# Patient Record
Sex: FEMALE | Race: WHITE | Employment: FULL TIME | URBAN - METROPOLITAN AREA
[De-identification: names, ages, dates, MRNs, and addresses within clinical notes are randomized per-mention and may not be internally consistent; named-entity substitution may affect disease eponyms.]

---

## 2017-01-12 ENCOUNTER — HOSPITAL ENCOUNTER (OUTPATIENT)
Dept: PHYSICAL THERAPY | Age: 37
Discharge: HOME | End: 2017-01-12
Payer: COMMERCIAL

## 2017-01-12 PROCEDURE — 97166 OT EVAL MOD COMPLEX 45 MIN: CPT

## 2017-01-12 PROCEDURE — 97530 THERAPEUTIC ACTIVITIES: CPT

## 2017-01-12 NOTE — PROGRESS NOTES
Brett Casarez  : 1980 [facility] at [address]  [phone]   [phone]       OUTPATIENT OCCUPATIONAL THERAPY SEATING AND POSITIONING EVALUATION: Initial Assessment and Treatment Day: 2017    ICD-10: Treatment Diagnosis: Paraplegia, incomplete (G82.22); Dependence on wheelchair (Z99.3)  Precautions/Allergies:   Pcn [penicillins]   Fall Risk Score: 9 (? 5 = High Risk)  MD Orders: Referral for pressure mapping clinic for pressure ulcer to left ischial. MEDICAL/REFERRING DIAGNOSIS:   Pressure ulcer of left buttock, stage 3 [L89.323]  Paraplegia, incomplete [G82.22]   DATE OF ONSET:    REFERRING PHYSICIAN: Corinna Argueta MD  RETURN PHYSICIAN APPOINTMENT: Unknown. INITIAL ASSESSMENT:  Ms. Canelo Kurtz is a pleasant lady with a history of C6/7 incomplete quadriplegia with absent BLE strength with inability to stand, decreased trunk control, and absent hand intrinsic musculature affecting her dexterity. She has a recent history of a stage 3 L ischial tuberosity ulcer being treated by Dr. Elizabeth Martines via bed rest and wet to dry wound dressing. MD ordered a pressure mapping for the most effective pressure re-distribution wheelchair cushion while she is in her ultra lightweight wheelchair. She does have a flexible R pelvic obliquity; however, she has increased pressure over her L ischial tuberosity. Several cushions were trialed. Her current wheelchair cushion yielded a 147.8 mmHG reading over her L ischial tuberosity (generally 60 - 80 mmHG is the most acceptable range). The "[employer]lite evolution skin protection/positioning cushion yielded a reading of 93 mmHG over her L ischial tuberosity. She liked this for ease of maintenance, but would like to trial additional cushions. If we change the cushion, she will likely need a new ultra lightweight wheelchair to accommodate the cushion height.   Plus, her current wheelchair is from 2008 and the hardware is wearing out such as her scissor brakes. Plan to do this as well on a subsequent visit. Ms. Ramsey Valerio also performed several transfers including into/out of her car. She tended to be in contact with the L wheel going from her car to the wheelchair, so recommended she raise the height of her car seat prior to transfer. This alleviated shear/contact on her L buttock. PLAN OF CARE:   PROBLEM LIST:   1. Decreased Strength  2. Decreased Balance  3. Decreased Skin Integrity/Hygeine   4. Increased pressure reading in current wheelchair setup with current foam cushion. 5. Current wheelchair is from 2008, does not accommodate best pressure redistributing wheelchair cushion, and worn is beyond repair (i.e. Scissor brakes). INTERVENTIONS PLANNED:  1. Theraputic activity  2. Wheelchair management   TREATMENT PLAN:  Effective Dates: 1/12/2017 TO 1/26/2017. Frequency/Duration: 1 time a week for 2 weeks  GOALS: (Goals have been discussed and agreed upon with patient.)  Short-Term Functional Goals: Time Frame: 1 visit  1. Colleen Washington will complete a car transfer without L buttock shear to prevent further skin breakdown independently. MET  Discharge Goals: Time Frame: 2 visits  1. Colleen Washington will be fitted for new ultra lightweight manual wheelchair to acomodate height of new cushion and enable independence with mobility related activities of daily living. Rehabilitation Potential For Stated Goals: Good  Regarding Michelle Carranza's therapy, I certify that the treatment plan above will be carried out by a therapist or under their direction. Thank you for this referral,  EVERARDO Whaley, OTR/L     Referring Physician Signature: Maddi Garcia MD _________________________  Date _________                OCCUPATIONAL PROFILE & HISTORY:   History of Present Injury/Illness (Reason for Referral):  Michelle Carranza sustained a C6/7 incomplete spinal cord injury in 1985. She works at [employer], drives, completes her own ADL. She recently developed a stage 3 decubitus ulcer after sitting on a heated car seat while wearing a dress - she noticed it on November 29th. She saw Dr. Darrell Patel at the [address] in December with improvements noted in wound (1 cm). She bought a gel overlay for her bed and was on bedrest for about 3 weeks. She has a standing frame that she stands in for 3 hours a day. She is out of the chair up to 10 hours a day. Prior to this, she has not developed a pressure ulcerwhen she  She does a squat pivot transfer into her car ([address]) and states she has a [employer]  for about hour a day. She states she doesn't typically do pressure reliefs, but moves around a lot. She has a standing frame that she. Past Medical History/Comorbidities:   Ms. Yohana España  has a past medical history of Abdominal pain; ADD (attention deficit disorder); Anxiety; Back pain; Chronic cystitis (4/3/2014); Closed fracture of left fibula and tibia; Depression; Edema; Flank pain; GERD (gastroesophageal reflux disease); Gross hematuria; Hand deformities, acquired; Hand pain; Herpes simplex; Incontinence of urine in female; Left ankle pain; Neurogenic bladder (4/3/2014); Neurogenic bladder; Neurogenic bladder, NOS; Neuropathy; Obesity; Other chronic cystitis; Paraplegia ([address] Quadriplegia ([address] and Urinary tract infection, site not specified. Ms. Yohana España  has a past surgical history that includes orthopaedic (1994) and other surgical.  Social History/Living Environment:   Home Environment: Private residence  Wheelchair Ramp: Yes  Living Alone: No  Current DME Used/Available at Home: Wheelchair, Transfer bench (padded shower bench; ultra lightweight - ti-lite)  Tub or Shower Type: Tub/Shower combinationShe works at [employer] office 40 hours a day. 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 Boyfriend assists with checking her bottom and changing her dressing. Activity Level: Drives, works, and is very active. Transfers per day: 8- 12 hours/day. Computer Use: 40 hour/week. Leisure: \"Boats. \" Swims   Driving: Drives to work on a daily basis in [employer]. ALTERNATE SEATING SURFACES: Tub transfer bench; bed with gel overlay; car seat  Current Medications:    Current Outpatient Prescriptions:     ondansetron hcl (ZOFRAN, AS HYDROCHLORIDE,) 8 mg tablet, Take 1 Tab by mouth two (2) times a day., Disp: 60 Tab, Rfl: 5    traMADol (ULTRAM) 50 mg tablet, Take 1 Tab by mouth every six (6) hours as needed for Pain. Max Daily Amount: 200 mg., Disp: 60 Tab, Rfl: 2    [START ON 1/18/2017] HYDROcodone-acetaminophen (NORCO) 7.5-325 mg per tablet, Take 1 Tab by mouth every eight (8) hours as needed for Pain. Max Daily Amount: 3 Tabs., Disp: 90 Tab, Rfl: 0    HYDROcodone-acetaminophen (NORCO) 7.5-325 mg per tablet, Take 1 Tab by mouth every eight (8) hours as needed for Pain. Max Daily Amount: 3 Tabs., Disp: 90 Tab, Rfl: 0    HYDROcodone-acetaminophen (NORCO) 7.5-325 mg per tablet, Take 1 Tab by mouth every eight (8) hours as needed for Pain. Max Daily Amount: 3 Tabs., Disp: 90 Tab, Rfl: 0    acyclovir (ZOVIRAX) 400 mg tablet, Take 400 mg by mouth three (3) times daily. For 5 days, Disp: , Rfl:     ketoconazole (NIZORAL) 200 mg tablet, Take 200 mg by mouth daily. For 14 days, Disp: , Rfl:     meloxicam (MOBIC) 15 mg tablet, Take 15 mg by mouth daily. , Disp: , Rfl:     omeprazole (PRILOSEC) 20 mg capsule, Take 20 mg by mouth daily. , Disp: , Rfl:     phentermine (ADIPEX-P) 37.5 mg tablet, Take 37.5 mg by mouth every morning., Disp: , Rfl:     ciprofloxacin HCl (CIPRO) 500 mg tablet, Take 1 Tab by mouth two (2) times a day., Disp: 14 Tab, Rfl: 0    ondansetron (ZOFRAN ODT) 8 mg disintegrating tablet, Take 1 Tab by mouth every eight (8) hours as needed for Nausea., Disp: 12 Tab, Rfl: 0    ibuprofen (MOTRIN) 800 mg tablet, Take  by mouth., Disp: , Rfl:     cyclobenzaprine (FLEXERIL) 10 mg tablet, Take 10 mg by mouth three (3) times daily as needed. , Disp: , Rfl:     gabapentin (NEURONTIN) 600 mg tablet, Take  by mouth two (2) times a day.  , Disp: , Rfl:            Date Last Reviewed:  1/12/2017   DME: TiLite TR (2008) - fixed wheel axle; padded tub-transfer bench; slimline skin protection wheelchair cushion. Complexity Level : Expanded review of therapy/medical records (1-2):  MODERATE COMPLEXITY   ASSESSMENT OF OCCUPATIONAL PERFORMANCE:   GROSS PRESENTATION/POSTURE:   Seated: Sits in neutral to anterior pelvic tilt   MENTAL STATUS:   Neurologic State: Alert    Orientation Level: Oriented to person, place, time and situation  Cognition: Intact. Perception: Appears intact. Safety/Judgement: Good. Understands/expresses information appropriately   Memory: Immediate Short Term Memory: Intact  Long term: Intact  VISION: Identifies obstacles in visual field and appropriately navigates around objects in wheelchair. Corrective Lenses: None. FUNCTIONAL MOBILITY:  Utilizes a semi-circular propulsion pattern for long distances in ultra lightweight wheelchair. She is completely independent in her ultra lightweight wheelchair including transfers, indoor, and outdoor environments. Transfers:   Chair to Mat: Modified independent  Sit to Stand: Unable  Supine to sit: Modified independent. Sit to supine: Modified independent  Mat Evaluation:   Measurements in sitting (in collaboration with )   Shoulder width, chest width, hip width, seat to top of head, seat to top of shoulder, seat to elbow, seat depth, and seat to floor all measured (see  note for details). Sitting Posture   Pelvis  Anterior/Posterior Pelvic Tilt: Sit in anterior pelvic tilt. Lateral pelvic tilt: Flexible R lateral pelvic tilt in supine.     Pelvic Obliquity: Sits in anterior pelvic tilt (flexibile)  Pelvic Rotation: No overt rotation noted. Spine   Thoracic spine: Able to retract scapulae: slight flexible kyphosis. Lumbar spine:  Lordotic flexible hypermobile lumbar spine. Lateral trunk: No overt shortening noted. Upper Extremities   Shoulder symmetry: Grossly equal in height. Lower Extremities  Hip position: Neutral  Knees: Good passive hamstring length. Feet: Passive dorsiflexion  Head/neck: WFL  TONE/SPASTICITY: Noted abductor  LLE>RLE in supine  SKIN INTEGRITY: L buttock with   SENSATION:   STRENGTH:   LUE Strength/ROM (General): Shoulders 5/5; elbow flexion/extension 5-/5; wrist flexion/extension: 4+/5;  3+/5; intrinsics: 0/5 RUE Strength/ROM (General): Shoulders 5/5; elbow flexion/extension 5/5; wrist flexion/extension: 4+/5;  4-/5; intrinsics: 0/5     LLE Strength/ROM (General): 0/5 (reports some gluteal activation) RLE Strength/ROM (General): 0/5 (reports some gluteal activation)   EDEMA: None noted. BALANCE:   Seated (Static): Good  Seated (Dynamic): 10\" forward reach. SKIN   Current Status: Wound is down to about 1 cm in length just distal to L ischial tuberosity. History: Noticed wound on L buttock/thigh on November 29th. Method for skin check: Boyfriend assists with wet to dry dressing. Resources for Management: Wound care center.      PRESSURE MAPPING Sensors Contacted Average Pressure Maximum Pressure Outcomes   Pressure Mapping #1 (Baseline Reading in current w/c setup - slimline) 244 38.8 mmHG 147.8 mmHG L IT    Pressure Mapping #2 - mat 225 37.9 mmHG 256 mmHG L IT    Pressure Mapping #3 - in current wheelchair [facility] cushion 292 38 mmHG 88 mmHG 60 mmHG decrease (from baseline)   Pressure Mapping #4 - [employer] Evolution 270 38.1 mmHG 93 mmHG 55 mmHG decrease   Pressure Mapping #5 - [employer] 2 gel 230 39.1 mmHG 98 mmHG  50 mmHG decrease   Pressure Mapping #6 - [employer] 262 40 mmHG 112 mmHG 36 mmHG decrease     FINAL RECOMMENDATIONS:   Physical Skills Involved:  1. Range of Motion  2. Balance  3. Mobility  4. Strength  5. Fine or Gross Motor Coordination  6. Sensation  7. Dexterity Cognitive Skills Affected (resulting in the inability to perform in a timely and safe manner):  1. No apparent deficits Psychosocial Skills Affected:  1. Habits  2. Routines and Behaviors  3. Environmental Adaptations   Number of elements that affect the Plan of Care: 3-5:  MODERATE COMPLEXITY   CLINICAL DECISION MAKING:   Outcome Measure: Tool Used: Lower Extremity Functional Scale (LEFS)  Score:  Initial: 41/80 Most Recent: X/80 (Date: -- )   Interpretation of Score: 20 questions each scored on a 5 point scale with 0 representing \"extreme difficulty or unable to perform\" and 4 representing \"no difficulty\". The lower the score, the greater the functional disability. 80/80 represents no disability. Minimal detectable change is 9 points. Medical Necessity:   · Patient demonstrates good rehab potential due to higher previous functional level. Reason for Services/Other Comments:  · Patient continues to require skilled intervention due to decreased wheelchair skills and knowledge of available equipment for pressure reduction/redistribution . Clinical Decision-Making Assessment:  Michelle Carranza trialed a number of options for wheelchair cushions and a solid back. Her current cushion is not providing adequate pressure re-distribution. The [employer] cushion provided a reduction of at least 55 mmHG to 93 mmHG and was the most comfortable for her. She would like to bring a cushion she has at home to trial/pressure map before making a final decision. She will also likely need a new ultra lightweight wheelchair as the cushion will alter the height/propulsion angle. Clinical Decision-Making: MODERATE COMPLEXITY   TREATMENT:   (In addition to Assessment/Re-Assessment sessions the following treatments were rendered)  Therapeutic Activity: (   15 minutes):   Therapeutic activities including car transfers to improve dynamic movement of arm - bilateral, leg - bilateral and trunk  to improve functional lifting, reaching and pushing . Required moderate   to promote static and dynamic balance in sitting and promote environmental adaptation (height of car seat). Michelle Carranza completed several wheelchair to car/car to wheelchair transfers to demonstrate how she typically does it. Suggested she raise the height of her car seat prior to transferring out of the car into her wheelchair and she did so without being in contact with wheel. Treatment/Session Assessment:  Michelle Carranza completed car transfers successfully without contacting her bottom against the wheelchair wheel. She plans to buy a padded toilet. · Pre-treatment Symptoms:    · Pain: Initial:  Pain Intensity 1: 5  Pain Location 1: Back  Pain Orientation 1: Lower  Post Session:  5/10 ·   Compliance with Program/Exercises: compliant today. · Recommendations/Intent for next treatment session: \"Next visit will focus on advancements to more challenging activities\".   Total Treatment Duration:  OT Patient Time In/Time Out  Time In: 1110  Time Out: EVERARDO Borges, OTR/L

## 2017-01-12 NOTE — PROGRESS NOTES
Ambulatory/Rehab Services H2 Model Falls Risk Assessment    Risk Factor Pts. ·   Confusion/Disorientation/Impulsivity  []    4 ·   Symptomatic Depression  []   2 ·   Altered Elimination  []   1 ·   Dizziness/Vertigo  []   1 ·   Gender (Male)  []   1 ·   Any administered antiepileptics (anticonvulsants):  []   2 ·   Any administered benzodiazepines:  []   1 ·   Visual Impairment (specify):  []   1 ·   Portable Oxygen Use  []   1 ·   Orthostatic ? BP  []   1 ·   History of Recent Falls (within 3 mos.)  [x]   5     Ability to Rise from Chair (choose one) Pts. ·   Ability to rise in a single movement  []   0 ·   Pushes up, successful in one attempt  []   1 ·   Multiple attempts, but successful  []   3 ·   Unable to rise without assistance  [x]   4   Total: (5 or greater = High Risk) 9     Falls Prevention Plan:   [x]                Physical Limitations to Exercise (specify): quadriplegic   [x]                Mobility Assistance Device (type): Ultra lightweight wheelchair. []                Exercise/Equipment Adaptation (specify):    ©2010 [facility] [address] [place] [address] States Patent #[id number].  Federal Law prohibits the replication, distribution or use without written permission from [employer]

## 2017-01-13 ENCOUNTER — HOSPITAL ENCOUNTER (OUTPATIENT)
Dept: WOUND CARE | Age: 37
Discharge: HOME | End: 2017-01-13
Attending: PODIATRIST
Payer: COMMERCIAL

## 2017-01-13 PROCEDURE — 99212 OFFICE O/P EST SF 10 MIN: CPT

## 2017-01-17 ENCOUNTER — HOSPITAL ENCOUNTER (OUTPATIENT)
Dept: PHYSICAL THERAPY | Age: 37
Discharge: HOME | End: 2017-01-17
Payer: COMMERCIAL

## 2017-01-17 PROCEDURE — 97168 OT RE-EVAL EST PLAN CARE: CPT

## 2017-01-17 PROCEDURE — 97542 WHEELCHAIR MNGMENT TRAINING: CPT

## 2017-01-17 NOTE — PROGRESS NOTES
Sue Perez  : 1980 [facility] at [address]  [phone]   [phone]       OUTPATIENT OCCUPATIONAL THERAPY SEATING AND POSITIONING EVALUATION: Treatment Day:  and Reassessment 2017    ICD-10: Treatment Diagnosis: Paraplegia, incomplete (G82.22); Dependence on wheelchair (Z99.3)  Precautions/Allergies:   Pcn [penicillins]   Fall Risk Score: 9 (? 5 = High Risk)  MD Orders: Referral for pressure mapping clinic for pressure ulcer to left ischial. MEDICAL/REFERRING DIAGNOSIS:   Pressure ulcer of left buttock, stage 3 [L89.323]  Paraplegia, incomplete [G82.22]   DATE OF ONSET:    REFERRING PHYSICIAN: Pedro Lujan MD(pressure mapping)/Dr. Coby Prakash MD (new wheelchair evaluation)  RETURN PHYSICIAN APPOINTMENT: Unknown. INTERDISCIPLINARY COLLABORATION: [facility] Smith ([employer])     INITIAL ASSESSMENT:  Ms. Haven Coyne is an active lady with a history of C6/7 incomplete quadriplegia with absent BLE strength with inability to stand, absent BLE sensation, decreased trunk control, and absent hand intrinsic musculature affecting her dexterity. She has a recent history of a stage 3 L ischial tuberosity ulcer being treated by Dr. Imelda Feliciano at the wound center. Miss Haven Coyne returned today for further pressure mapping and to assess her for a new ultra lightweight wheelchair. The comfort company [employer] skin protection and positioning cushion provided the most relief with 79.5 mmHG pressure over her L ischial tuberosity which is an improvement of at least 68 mmHG from her baseline. Recommend this or a similar skin protection and positioning cushion as she has decreased ability to functionally weight shift due to decreased trunk control, a R pelvic obliquity, and a history of stage 3 wound near her L ischial tuberosity. The adjustable tension straps were tightened for additional trunk support.   She needs a new ultra lightweight wheelchair to accommodate a new skin protection, positioning, and pressure reducing cushion for optimal propulsion pattern and less wear on her shoulder musculature. Also, the scissor brakes are worn beyond repair along with the entire chair which was purchased in 2008. She needs a new ultra lightweight manual wheelchair due the aforementioned reasons and to maximize her independence with mobility related activities of daily living including return to work. PLAN OF CARE:   PROBLEM LIST:   1. Decreased Strength  2. Decreased Balance  3. Decreased Skin Integrity/Hygeine   4. Increased pressure reading in current wheelchair setup with current foam cushion. 5. Current wheelchair is from 2008, does not accommodate best pressure redistributing wheelchair cushion, and worn is beyond repair (i.e. Scissor brakes). INTERVENTIONS PLANNED:  1. Theraputic activity  2. Wheelchair management   TREATMENT PLAN:  Effective Dates: 1/12/2017 TO 1/26/2017. Frequency/Duration: 1 time a week for 2 weeks  GOALS: (Goals have been discussed and agreed upon with patient.)  Short-Term Functional Goals: Time Frame: 1 visit  1. Chuckie Franco will complete a car transfer without L buttock shear to prevent further skin breakdown independently. MET  Discharge Goals: Time Frame: 2 visits  1. Chuckie Franco will be fitted for new ultra lightweight manual wheelchair to acomodate height of new cushion and enable independence with mobility related activities of daily living. MET  Rehabilitation Potential For Stated Goals: Goals met  EQUIPMENT RECOMMENDATIONS:  1. Manual ultra lightweight wheelchair to maximize independence with mobility related activities of daily living secondary to C6/7 quadriplegia and current wheelchair is worn beyond repair.          2. Skin protection and positioning cushion secondary to history of stage 3 wound near ischial tuberosity, R pelvic obliquity, and decreased ability to complete functional weight shifts secondary to decreased trunk control. 3. Plastic coated handrims to increase friction needed for effective wheelchair propulsion secondary to decreased /hand strength/function. 4. Pneumatic tires for ease of wheelchair propulsion and ability to propel chair over multiple surfaces. 5. Adjustable tension back to enable improved posture, trunk support, and effective wheelchair propulsion. Regarding Michelle Carranza's therapy, I certify that the treatment plan above will be carried out by a therapist or under their direction. Thank you for this referral,  EVERARDO Benjamin, OTR/L     Referring Physician Signature: _________________________  Date _________                OCCUPATIONAL PROFILE & HISTORY:   History of Present Injury/Illness (Reason for Referral):  Michelle Carranza sustained a C6/7 incomplete spinal cord injury in 1985. She works at [employer], drives, completes her own ADL. She recently developed a stage 3 decubitus ulcer after sitting on a heated car seat while wearing a dress - she noticed it on November 29th. She saw Dr. Haydee Garcia at the [facility] in December with improvements noted in wound (1 cm). She bought a gel overlay for her bed and was on bedrest for about 3 weeks. She has a standing frame that she stands in for 3 hours a day. She is out of the chair up to 10 hours a day. Prior to this, she has not developed a pressure ulcerwhen she  She does a squat pivot transfer into her car ([address]) and states she has a [employer]  for about hour a day. She states she doesn't typically do pressure reliefs, but moves around a lot. She has a standing frame that she. Past Medical History/Comorbidities:   Ms. Michel Barba  has a past medical history of Abdominal pain; ADD (attention deficit disorder); Anxiety; Back pain; Chronic cystitis (4/3/2014); Closed fracture of left fibula and tibia; Depression; Edema;  Flank pain; GERD (gastroesophageal reflux disease); Gross hematuria; Hand deformities, acquired; Hand pain; Herpes simplex; Incontinence of urine in female; Left ankle pain; Neurogenic bladder (4/3/2014); Neurogenic bladder; Neurogenic bladder, NOS; Neuropathy; Obesity; Other chronic cystitis; Paraplegia ([address] Quadriplegia ([address] and Urinary tract infection, site not specified. Ms. Laine Kelly  has a past surgical history that includes orthopaedic (1994) and other surgical.  Social History/Living Environment:    She works at [employer] office 40 hours a day. 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 Boyfriend assists with checking her bottom and changing her dressing. Activity Level: Drives, works, and is very active. Transfers per day: 8- 12 hours/day. Computer Use: 40 hour/week. Leisure: \"Boats. \" Swims   Driving: Drives to work on a daily basis in [employer]. ALTERNATE SEATING SURFACES: Tub transfer bench; bed with gel overlay; car seat  Current Medications:    Current Outpatient Prescriptions:     ondansetron hcl (ZOFRAN, AS HYDROCHLORIDE,) 8 mg tablet, Take 1 Tab by mouth two (2) times a day., Disp: 60 Tab, Rfl: 5    traMADol (ULTRAM) 50 mg tablet, Take 1 Tab by mouth every six (6) hours as needed for Pain. Max Daily Amount: 200 mg., Disp: 60 Tab, Rfl: 2    [START ON 1/18/2017] HYDROcodone-acetaminophen (NORCO) 7.5-325 mg per tablet, Take 1 Tab by mouth every eight (8) hours as needed for Pain. Max Daily Amount: 3 Tabs., Disp: 90 Tab, Rfl: 0    HYDROcodone-acetaminophen (NORCO) 7.5-325 mg per tablet, Take 1 Tab by mouth every eight (8) hours as needed for Pain. Max Daily Amount: 3 Tabs., Disp: 90 Tab, Rfl: 0    HYDROcodone-acetaminophen (NORCO) 7.5-325 mg per tablet, Take 1 Tab by mouth every eight (8) hours as needed for Pain. Max Daily Amount: 3 Tabs., Disp: 90 Tab, Rfl: 0    acyclovir (ZOVIRAX) 400 mg tablet, Take 400 mg by mouth three (3) times daily.  For 5 days, Disp: , Rfl:     ketoconazole (NIZORAL) 200 mg tablet, Take 200 mg by mouth daily. For 14 days, Disp: , Rfl:     meloxicam (MOBIC) 15 mg tablet, Take 15 mg by mouth daily. , Disp: , Rfl:     omeprazole (PRILOSEC) 20 mg capsule, Take 20 mg by mouth daily. , Disp: , Rfl:     phentermine (ADIPEX-P) 37.5 mg tablet, Take 37.5 mg by mouth every morning., Disp: , Rfl:     ciprofloxacin HCl (CIPRO) 500 mg tablet, Take 1 Tab by mouth two (2) times a day., Disp: 14 Tab, Rfl: 0    ondansetron (ZOFRAN ODT) 8 mg disintegrating tablet, Take 1 Tab by mouth every eight (8) hours as needed for Nausea., Disp: 12 Tab, Rfl: 0    ibuprofen (MOTRIN) 800 mg tablet, Take  by mouth., Disp: , Rfl:     cyclobenzaprine (FLEXERIL) 10 mg tablet, Take 10 mg by mouth three (3) times daily as needed. , Disp: , Rfl:     gabapentin (NEURONTIN) 600 mg tablet, Take  by mouth two (2) times a day.  , Disp: , Rfl:            Date Last Reviewed:  1/17/2017   DME: TiLite TR (2008) - fixed wheel axle; padded tub-transfer bench; slimline skin protection wheelchair cushion. Complexity Level : Expanded review of therapy/medical records (1-2):  MODERATE COMPLEXITY   ASSESSMENT OF OCCUPATIONAL PERFORMANCE:   GROSS PRESENTATION/POSTURE:   Seated: Sits in neutral to anterior pelvic tilt   MENTAL STATUS:   Neurologic State: Alert    Orientation Level: Oriented to person, place, time and situation  Cognition: Intact. Perception: Appears intact. Safety/Judgement: Good. Understands/expresses information appropriately   Memory: Immediate Short Term Memory: Intact  Long term: Intact  VISION: Identifies obstacles in visual field and appropriately navigates around objects in wheelchair. Corrective Lenses: None. FUNCTIONAL MOBILITY:  Utilizes a semi-circular propulsion pattern for long distances in ultra lightweight wheelchair. She is completely independent in her ultra lightweight wheelchair including transfers, indoor, and outdoor environments. Transfers:   Chair to Mat: Modified independent  Sit to Stand: Unable  Supine to sit: Modified independent. Sit to supine: Modified independent  Mat Evaluation:   Measurements in sitting (in collaboration with )   Shoulder width, chest width, hip width, seat to top of head, seat to top of shoulder, seat to elbow, seat depth, and seat to floor all measured (see  note for details). Sitting Posture   Pelvis  Anterior/Posterior Pelvic Tilt: Sit in anterior pelvic tilt. Lateral pelvic tilt: Flexible R lateral pelvic tilt in supine. Pelvic Obliquity: Sits in anterior pelvic tilt (flexibile)  Pelvic Rotation: No overt rotation noted. Spine   Thoracic spine: Able to retract scapulae: slight flexible kyphosis. Lumbar spine:  Lordotic flexible hypermobile lumbar spine. Lateral trunk: No overt shortening noted. Upper Extremities   Shoulder symmetry: Grossly equal in height. Lower Extremities  Hip position: Neutral  Knees: Good passive hamstring length. Feet: Passive dorsiflexion  Head/neck: WFL  TONE/SPASTICITY: Noted abductor tone LLE>RLE in supine. SKIN INTEGRITY: L buttock with stage 3 1-2 cm by 1-2 cm wound near ischial tuberosity. SENSATION:   STRENGTH:   LUE Strength/ROM (General): Shoulders 5/5; elbow flexion/extension 5-/5; wrist flexion/extension: 4+/5;  3+/5; intrinsics: 0/5 RUE Strength/ROM (General): Shoulders 5/5; elbow flexion/extension 5/5; wrist flexion/extension: 4+/5;  4-/5; intrinsics: 0/5     LLE Strength/ROM (General): 0/5 (reports some gluteal activation) RLE Strength/ROM (General): 0/5 (reports some gluteal activation)   EDEMA: None noted. BALANCE:   Seated (Static): Good  Seated (Dynamic): 10\" forward reach. SKIN   Current Status: Wound is down to about 1 cm in length just distal to L ischial tuberosity. History: Noticed wound on L buttock/thigh on November 29th.    Method for skin check: Boyfriend assists with wet to dry dressing. Resources for Management: Wound care center. (Highlighted pressure mapping done this visit - reassessment)  PRESSURE MAPPING Sensors Contacted Average Pressure Maximum Pressure Outcomes   Pressure Mapping #1 (Baseline Reading in current w/c setup - slimline) 244 38.8 mmHG 147.8 mmHG L IT    Pressure Mapping #2 - mat 225 37.9 mmHG 256 mmHG L IT    Pressure Mapping #3 - in current wheelchair [employer] [place] cushion 292 38 mmHG 88 mmHG 60 mmHG decrease (from baseline)   Pressure Mapping #4 - Varilite Evolution 270 38.1 mmHG 93 mmHG 55 mmHG decrease   Pressure Mapping #5 - Pankaj 2 gel 230 39.1 mmHG 98 mmHG  50 mmHG decrease   Pressure Mapping #6 - Samson X - comfort company 262 40 mmHG 112 mmHG 36 mmHG decrease   Pressure Mapping #7 -  Varilite Proform 281 40.9 mmHG 79.5 mmHG L IT 68 mmHG decrease   Pressure Mapping #8 - Vector - comfort company 231 35.5 mmHG 85.5 mmHG L IT 64 mmHG decrease            FINAL RECOMMENDATIONS:   Physical Skills Involved:  1. Range of Motion  2. Balance  3. Mobility  4. Strength  5. Fine or Gross Motor Coordination  6. Sensation  7. Dexterity Cognitive Skills Affected (resulting in the inability to perform in a timely and safe manner):  1. No apparent deficits Psychosocial Skills Affected:  1. Habits  2. Routines and Behaviors  3. Environmental Adaptations   Number of elements that affect the Plan of Care: 3-5:  MODERATE COMPLEXITY   CLINICAL DECISION MAKING:   Outcome Measure: Tool Used: Lower Extremity Functional Scale (LEFS)  Score:  Initial: 41/80 Most Recent: X/80 (Date: -- )   Interpretation of Score: 20 questions each scored on a 5 point scale with 0 representing \"extreme difficulty or unable to perform\" and 4 representing \"no difficulty\". The lower the score, the greater the functional disability. 80/80 represents no disability. Minimal detectable change is 9 points.   Medical Necessity:   · Patient demonstrates good rehab potential due to higher previous functional level.  Reason for Services/Other Comments:  · Patient continues to require skilled intervention due to decreased wheelchair skills and knowledge of available equipment for pressure reduction/redistribution . Clinical Decision-Making Assessment:  Michelle Carranza trialed a number of options for wheelchair cushions and a solid back. Her current cushion is not providing adequate pressure re-distribution. The Varilite cushion provided a reduction of at least 55 mmHG to 93 mmHG and was the most comfortable for her. She would like to bring a cushion she has at home to trial/pressure map before making a final decision. She will also likely need a new ultra lightweight wheelchair as the cushion will alter the height/propulsion angle. Clinical Decision-Making: MODERATE COMPLEXITY   TREATMENT:   (In addition to Assessment/Re-Assessment sessions the following treatments were rendered)  Wheelchair Management and Training: (10 minutes): Procedure(s) utilized to improve and/or restore functioning as related to manual wheelchair mobility. Required moderate tactile cueing to facilitate trunk control by tightening adjustable tension straps. Treatment/Session Assessment:  Michelle Carranza relayed greater comfort and trunk support after adjustable tension straps tightened. [address] cushion was the most effective pressure redistribution cushion trialed today and between the two visits. · Pre-treatment Symptoms:    · Pain: Initial:    5/10 Post Session:  5/10 ·   Compliance with Program/Exercises: compliant today. · Recommendations/Intent for next treatment session: \"Next visit will focus on advancements to more challenging activities\".   Total Treatment Duration:  OT Patient Time In/Time Out  Time In: 1445  Time Out: [facility] EVERARDO Leiva, OTR/L

## 2017-04-10 ENCOUNTER — HOSPITAL ENCOUNTER (OUTPATIENT)
Dept: CT IMAGING | Age: 37
Discharge: HOME | End: 2017-04-10
Attending: NURSE PRACTITIONER
Payer: COMMERCIAL

## 2017-04-10 VITALS — BODY MASS INDEX: 24.99 KG/M2 | HEIGHT: 65 IN | WEIGHT: 150 LBS

## 2017-04-10 DIAGNOSIS — N30.20 CHRONIC CYSTITIS: ICD-10-CM

## 2017-04-10 PROCEDURE — 74178 CT ABD&PLV WO CNTR FLWD CNTR: CPT

## 2017-04-10 PROCEDURE — 74011000258 HC RX REV CODE- 258: Performed by: NURSE PRACTITIONER

## 2017-04-10 PROCEDURE — 74011636320 HC RX REV CODE- 636/320: Performed by: NURSE PRACTITIONER

## 2017-04-10 RX ORDER — SODIUM CHLORIDE 0.9 % (FLUSH) 0.9 %
10 SYRINGE (ML) INJECTION
Status: COMPLETED | OUTPATIENT
Start: 2017-04-10 | End: 2017-04-10

## 2017-04-10 RX ADMIN — Medication 10 ML: at 15:28

## 2017-04-10 RX ADMIN — SODIUM CHLORIDE 100 ML: 900 INJECTION, SOLUTION INTRAVENOUS at 15:28

## 2017-04-10 RX ADMIN — IOPAMIDOL 100 ML: 755 INJECTION, SOLUTION INTRAVENOUS at 15:28

## 2017-04-11 NOTE — PROGRESS NOTES
Small bilateral kidney stones. Follow up for cystoscopy as recommended during last OV. I did not see this scheduled.

## 2018-09-18 PROBLEM — G56.03 CARPAL TUNNEL SYNDROME ON BOTH SIDES: Status: ACTIVE | Noted: 2018-09-18

## 2018-09-18 PROBLEM — M25.531 PAIN IN BOTH WRISTS: Status: ACTIVE | Noted: 2018-09-18

## 2018-09-18 PROBLEM — M25.532 PAIN IN BOTH WRISTS: Status: ACTIVE | Noted: 2018-09-18

## 2019-03-22 ENCOUNTER — APPOINTMENT (OUTPATIENT)
Dept: GENERAL RADIOLOGY | Age: 39
End: 2019-03-22
Attending: EMERGENCY MEDICINE
Payer: COMMERCIAL

## 2019-03-22 ENCOUNTER — HOSPITAL ENCOUNTER (EMERGENCY)
Age: 39
Discharge: HOME | End: 2019-03-22
Attending: EMERGENCY MEDICINE
Payer: COMMERCIAL

## 2019-03-22 VITALS
OXYGEN SATURATION: 98 % | SYSTOLIC BLOOD PRESSURE: 119 MMHG | HEART RATE: 80 BPM | RESPIRATION RATE: 16 BRPM | TEMPERATURE: 98.3 F | DIASTOLIC BLOOD PRESSURE: 75 MMHG

## 2019-03-22 DIAGNOSIS — R10.9 ACUTE ABDOMINAL PAIN: Primary | ICD-10-CM

## 2019-03-22 LAB
ANION GAP SERPL CALC-SCNC: 6 MMOL/L (ref 7–16)
BUN SERPL-MCNC: 14 MG/DL (ref 6–23)
CALCIUM SERPL-MCNC: 8.8 MG/DL (ref 8.3–10.4)
CHLORIDE SERPL-SCNC: 107 MMOL/L (ref 98–107)
CO2 SERPL-SCNC: 26 MMOL/L (ref 21–32)
CREAT SERPL-MCNC: 0.56 MG/DL (ref 0.6–1)
ERYTHROCYTE [DISTWIDTH] IN BLOOD BY AUTOMATED COUNT: 12.4 % (ref 11.9–14.6)
GLUCOSE SERPL-MCNC: 87 MG/DL (ref 65–100)
HCT VFR BLD AUTO: 40.8 % (ref 35.8–46.3)
HGB BLD-MCNC: 13.4 G/DL (ref 11.7–15.4)
MCH RBC QN AUTO: 31.2 PG (ref 26.1–32.9)
MCHC RBC AUTO-ENTMCNC: 32.8 G/DL (ref 31.4–35)
MCV RBC AUTO: 94.9 FL (ref 79.6–97.8)
NRBC # BLD: 0 K/UL (ref 0–0.2)
PLATELET # BLD AUTO: 279 K/UL (ref 150–450)
PMV BLD AUTO: 9.6 FL (ref 9.4–12.3)
POTASSIUM SERPL-SCNC: 4 MMOL/L (ref 3.5–5.1)
RBC # BLD AUTO: 4.3 M/UL (ref 4.05–5.2)
SODIUM SERPL-SCNC: 139 MMOL/L (ref 136–145)
WBC # BLD AUTO: 9.4 K/UL (ref 4.3–11.1)

## 2019-03-22 PROCEDURE — 99284 EMERGENCY DEPT VISIT MOD MDM: CPT | Performed by: EMERGENCY MEDICINE

## 2019-03-22 PROCEDURE — 96360 HYDRATION IV INFUSION INIT: CPT | Performed by: EMERGENCY MEDICINE

## 2019-03-22 PROCEDURE — 74011250636 HC RX REV CODE- 250/636: Performed by: EMERGENCY MEDICINE

## 2019-03-22 PROCEDURE — 74022 RADEX COMPL AQT ABD SERIES: CPT

## 2019-03-22 PROCEDURE — 80048 BASIC METABOLIC PNL TOTAL CA: CPT

## 2019-03-22 PROCEDURE — 85027 COMPLETE CBC AUTOMATED: CPT

## 2019-03-22 RX ORDER — SODIUM CHLORIDE 9 MG/ML
125 INJECTION, SOLUTION INTRAVENOUS CONTINUOUS
Status: DISCONTINUED | OUTPATIENT
Start: 2019-03-22 | End: 2019-03-22 | Stop reason: HOSPADM

## 2019-03-22 RX ADMIN — SODIUM CHLORIDE 1000 ML: 900 INJECTION, SOLUTION INTRAVENOUS at 12:35

## 2019-03-22 NOTE — ED NOTES
I have reviewed discharge instructions with the patient. The patient verbalized understanding. Patient left ED via Discharge Method: wheelchair to Home with family member. Opportunity for questions and clarification provided. Patient given 0 scripts. To continue your aftercare when you leave the hospital, you may receive an automated call from our care team to check in on how you are doing. This is a free service and part of our promise to provide the best care and service to meet your aftercare needs.  If you have questions, or wish to unsubscribe from this service please call [phone]. Thank you for Choosing our [facility] Emergency Department.

## 2019-03-22 NOTE — DISCHARGE INSTRUCTIONS
Return with any fevers, bleeding, vomiting, worsening symptoms, or additional concerns. Follow-up with your gastroenterologist for reevaluation on Monday or Tuesday.

## 2019-03-22 NOTE — ED PROVIDER NOTES
42-year-old lady presents with concerns about abdominal pain and inability to have a bowel movement in several days. Patient has a long time history of Partial Quadriplegia and problems with bowel movements. She says that she has tried several different laxatives, enemas, and Magic bullets without any relief. She was seen in the GI doctor's office today and had an x-ray that showed a possible small bowel obstruction. She was then sent here for additional imaging. Patient says that she has had no vomiting. She does describe a diffuse crampy abdominal pain that is a ten out of ten. She says it does not radiate anywhere. Elements of this note were created using speech recognition software. As such, errors of speech recognition may be present. Past Medical History:  
Diagnosis Date  Abdominal pain  ADD (attention deficit disorder)  Anxiety  Back pain  Chronic cystitis 4/3/2014  Closed fracture of left fibula and tibia  Depression  Edema  Flank pain  Fusion of spine, cervical region  GERD (gastroesophageal reflux disease)  Gross hematuria  Hand deformities, acquired  Hand pain  Herpes simplex  Incontinence of urine in female  Left ankle pain  Neurogenic bladder 4/3/2014  Neurogenic bladder  Neurogenic bladder, NOS   
 Neuropathy  Obesity  Other chronic cystitis  Paraplegia ([address]  Quadriplegia ([address]  Urinary tract infection, site not specified Past Surgical History:  
Procedure Laterality Date  HX ORTHOPAEDIC  1994  
 spinal fusion  HX ORTHOPAEDIC    
 HX OTHER SURGICAL    
 implanted stimulator with 8 electrodes Family History:  
Problem Relation Age of Onset  Heart Disease Mother  Hypertension Mother  Coronary Artery Disease Mother  Cancer Other   
     gen fam HX  Diabetes Other   
     gen fam HX  
 High Cholesterol Other   
     gen fam HX  Breast Cancer Maternal Aunt  Coronary Artery Disease Maternal Uncle  Heart Disease Maternal Grandmother  Coronary Artery Disease Maternal Grandmother  Heart Disease Maternal Grandfather  Coronary Artery Disease Maternal Grandfather Social History Socioeconomic History  Marital status: SINGLE Spouse name: Not on file  Number of children: Not on file  Years of education: Not on file  Highest education level: Not on file Occupational History  Not on file Social Needs  Financial resource strain: Not on file  Food insecurity:  
  Worry: Not on file Inability: Not on file  Transportation needs:  
  Medical: Not on file Non-medical: Not on file Tobacco Use  Smoking status: Never Smoker  Smokeless tobacco: Never Used Substance and Sexual Activity  Alcohol use: Yes Comment: weekends  Drug use: No  
 Sexual activity: Yes  
  Partners: Male Birth control/protection: None Lifestyle  Physical activity:  
  Days per week: Not on file Minutes per session: Not on file  Stress: Not on file Relationships  Social connections:  
  Talks on phone: Not on file Gets together: Not on file Attends Protestant service: Not on file Active member of club or organization: Not on file Attends meetings of clubs or organizations: Not on file Relationship status: Not on file  Intimate partner violence:  
  Fear of current or ex partner: Not on file Emotionally abused: Not on file Physically abused: Not on file Forced sexual activity: Not on file Other Topics Concern [address] Service Not Asked  Blood Transfusions Not Asked  Caffeine Concern Not Asked  Occupational Exposure Not Asked [place] Hazards Not Asked  Sleep Concern Not Asked  Stress Concern Not Asked  Weight Concern Not Asked  Special Diet Not Asked  Back Care Not Asked  Exercise Not Asked  Bike Helmet Not Asked [address] [facility] Yes  Self-Exams Not Asked Social History Narrative ** Merged History Encounter ** Denies physical or sexual abuse ALLERGIES: Betadine [povidone-iodine] and Pcn [penicillins] Review of Systems Constitutional: Negative for chills, diaphoresis and fever. HENT: Negative for congestion, rhinorrhea and sore throat. Eyes: Negative for redness and visual disturbance. Respiratory: Negative for cough, chest tightness, shortness of breath and wheezing. Cardiovascular: Negative for chest pain and palpitations. Gastrointestinal: Positive for abdominal pain and constipation. Negative for blood in stool, diarrhea, nausea and vomiting. Endocrine: Negative for polydipsia and polyuria. Genitourinary: Negative for dysuria and hematuria. Musculoskeletal: Negative for arthralgias, myalgias and neck stiffness. Skin: Negative for rash. Allergic/Immunologic: Negative for environmental allergies and food allergies. Neurological: Negative for dizziness, weakness and headaches. Hematological: Negative for adenopathy. Does not bruise/bleed easily. Psychiatric/Behavioral: Negative for confusion and sleep disturbance. The patient is not nervous/anxious. Vitals:  
 03/22/19 1018 BP: 95/66 Pulse: 95 Resp: 16 Temp: 98.3 °F (36.8 °C) SpO2: 100% Physical Exam  
Constitutional: She is oriented to person, place, and time. She appears well-developed and well-nourished. HENT:  
Head: Normocephalic and atraumatic. Mouth/Throat: Oropharynx is clear and moist.  
Eyes: Pupils are equal, round, and reactive to light. Conjunctivae are normal. Right eye exhibits no discharge. Left eye exhibits no discharge. Neck: No thyromegaly present. Cardiovascular: Normal rate, regular rhythm and normal heart sounds. No murmur heard.  
Pulmonary/Chest: Effort normal and breath sounds normal.  
 Abdominal: Soft. There is tenderness. There is no rebound and no guarding. Diminished bowel sounds, diffuse mild tenderness without rebound or guarding Musculoskeletal: Normal range of motion. She exhibits no edema. Neurological: She is alert and oriented to person, place, and time. She exhibits normal muscle tone. Coordination normal.  
Skin: Skin is warm and dry. Psychiatric: She has a normal mood and affect. Her behavior is normal.  
  
 
MDM Number of Diagnoses or Management Options Diagnosis management comments: 10:55 AM 
Radiology called and would prefer to start with an acute abdominal series. Therefore I will order that. 11:09 AM 
I spoke with the on-call GI advanced practice provider who was going to try to get further clarification from the providers who saw her in the office today so that we can come up with an appropriate plan. 
 
2:09 PM 
X-rays revealed no evidence for small bowel obstruction. I had planned to do a milk and molasses enema however we are out of molasses. Patient says that she has tried MiraLAX, fleets, soapsuds enemas, and Ex-Lax without any relief. I will discuss with GI for further suggestions. 2:40 PM 
I spoke with GI who advised a prescription prep called Suprep. She apparently received some samples of that in the office so I do not need to write a prescription for it. Procedures

## 2019-03-22 NOTE — ED TRIAGE NOTES
Pt was sent by GI for possible SBO. Pt has a hx of bowel obstructions. Has not had a BM in a week and a half per patient. States abd discomfort

## 2019-03-26 ENCOUNTER — HOSPITAL ENCOUNTER (OUTPATIENT)
Dept: CT IMAGING | Age: 39
Discharge: HOME | End: 2019-03-26
Payer: OTHER GOVERNMENT

## 2019-03-26 DIAGNOSIS — R10.84 ABDOMINAL PAIN, GENERALIZED: ICD-10-CM

## 2019-03-26 DIAGNOSIS — R11.0 NAUSEA: ICD-10-CM

## 2019-03-26 DIAGNOSIS — K59.09 CHRONIC CONSTIPATION: ICD-10-CM

## 2019-03-26 PROCEDURE — 74177 CT ABD & PELVIS W/CONTRAST: CPT

## 2019-03-26 PROCEDURE — 74011636320 HC RX REV CODE- 636/320: Performed by: NURSE PRACTITIONER

## 2019-03-26 PROCEDURE — 74011000255 HC RX REV CODE- 255: Performed by: NURSE PRACTITIONER

## 2019-03-26 PROCEDURE — 74011000258 HC RX REV CODE- 258: Performed by: NURSE PRACTITIONER

## 2019-03-26 RX ORDER — SODIUM CHLORIDE 0.9 % (FLUSH) 0.9 %
10 SYRINGE (ML) INJECTION
Status: COMPLETED | OUTPATIENT
Start: 2019-03-26 | End: 2019-03-26

## 2019-03-26 RX ORDER — SODIUM CHLORIDE 0.9 % (FLUSH) 0.9 %
10 SYRINGE (ML) INJECTION
OUTPATIENT
Start: 2019-03-26 | End: 2019-03-26

## 2019-03-26 RX ADMIN — SODIUM CHLORIDE 100 ML: 900 INJECTION, SOLUTION INTRAVENOUS at 15:50

## 2019-03-26 RX ADMIN — IOPAMIDOL 100 ML: 755 INJECTION, SOLUTION INTRAVENOUS at 15:50

## 2019-03-26 RX ADMIN — Medication 10 ML: at 15:50

## 2019-03-26 RX ADMIN — BARIUM SULFATE 1350 ML: 1 SUSPENSION ORAL at 15:50

## 2019-03-27 ENCOUNTER — HOSPITAL ENCOUNTER (OUTPATIENT)
Age: 39
Setting detail: OBSERVATION
Discharge: HOME | End: 2019-03-30
Attending: SURGERY | Admitting: SURGERY
Payer: COMMERCIAL

## 2019-03-27 ENCOUNTER — ANESTHESIA EVENT (OUTPATIENT)
Dept: ENDOSCOPY | Age: 39
End: 2019-03-27
Payer: COMMERCIAL

## 2019-03-27 PROBLEM — R10.9 ABDOMINAL PAIN: Status: ACTIVE | Noted: 2019-03-27

## 2019-03-27 PROBLEM — K56.49 IMPACTION OF COLON (HCC): Status: ACTIVE | Noted: 2019-03-27

## 2019-03-27 LAB
ANION GAP SERPL CALC-SCNC: 6 MMOL/L (ref 7–16)
BUN SERPL-MCNC: 10 MG/DL (ref 6–23)
CALCIUM SERPL-MCNC: 8.5 MG/DL (ref 8.3–10.4)
CHLORIDE SERPL-SCNC: 113 MMOL/L (ref 98–107)
CO2 SERPL-SCNC: 22 MMOL/L (ref 21–32)
CREAT SERPL-MCNC: 0.47 MG/DL (ref 0.6–1)
ERYTHROCYTE [DISTWIDTH] IN BLOOD BY AUTOMATED COUNT: 12.5 % (ref 11.9–14.6)
GLUCOSE SERPL-MCNC: 87 MG/DL (ref 65–100)
HCT VFR BLD AUTO: 38 % (ref 35.8–46.3)
HGB BLD-MCNC: 12.6 G/DL (ref 11.7–15.4)
MCH RBC QN AUTO: 31.2 PG (ref 26.1–32.9)
MCHC RBC AUTO-ENTMCNC: 33.2 G/DL (ref 31.4–35)
MCV RBC AUTO: 94.1 FL (ref 79.6–97.8)
NRBC # BLD: 0 K/UL (ref 0–0.2)
PLATELET # BLD AUTO: 262 K/UL (ref 150–450)
PMV BLD AUTO: 9.4 FL (ref 9.4–12.3)
POTASSIUM SERPL-SCNC: 4 MMOL/L (ref 3.5–5.1)
RBC # BLD AUTO: 4.04 M/UL (ref 4.05–5.2)
SODIUM SERPL-SCNC: 141 MMOL/L (ref 136–145)
WBC # BLD AUTO: 9.6 K/UL (ref 4.3–11.1)

## 2019-03-27 PROCEDURE — 99218 HC RM OBSERVATION: CPT

## 2019-03-27 PROCEDURE — 74011250637 HC RX REV CODE- 250/637: Performed by: PHYSICIAN ASSISTANT

## 2019-03-27 PROCEDURE — 96374 THER/PROPH/DIAG INJ IV PUSH: CPT

## 2019-03-27 PROCEDURE — 80048 BASIC METABOLIC PNL TOTAL CA: CPT

## 2019-03-27 PROCEDURE — 85027 COMPLETE CBC AUTOMATED: CPT

## 2019-03-27 PROCEDURE — 74011250637 HC RX REV CODE- 250/637: Performed by: NURSE PRACTITIONER

## 2019-03-27 PROCEDURE — 36415 COLL VENOUS BLD VENIPUNCTURE: CPT

## 2019-03-27 PROCEDURE — 74011250636 HC RX REV CODE- 250/636: Performed by: NURSE PRACTITIONER

## 2019-03-27 PROCEDURE — 83520 IMMUNOASSAY QUANT NOS NONAB: CPT

## 2019-03-27 RX ORDER — SODIUM CHLORIDE 0.9 % (FLUSH) 0.9 %
5-40 SYRINGE (ML) INJECTION AS NEEDED
Status: DISCONTINUED | OUTPATIENT
Start: 2019-03-27 | End: 2019-03-30 | Stop reason: HOSPADM

## 2019-03-27 RX ORDER — GABAPENTIN 300 MG/1
600 CAPSULE ORAL 3 TIMES DAILY
Status: DISCONTINUED | OUTPATIENT
Start: 2019-03-27 | End: 2019-03-30 | Stop reason: HOSPADM

## 2019-03-27 RX ORDER — HYDROCODONE BITARTRATE AND ACETAMINOPHEN 7.5; 325 MG/1; MG/1
1 TABLET ORAL EVERY 8 HOURS
Status: DISCONTINUED | OUTPATIENT
Start: 2019-03-27 | End: 2019-03-30 | Stop reason: HOSPADM

## 2019-03-27 RX ORDER — SODIUM CHLORIDE 0.9 % (FLUSH) 0.9 %
5-40 SYRINGE (ML) INJECTION EVERY 8 HOURS
Status: DISCONTINUED | OUTPATIENT
Start: 2019-03-27 | End: 2019-03-29 | Stop reason: SDUPTHER

## 2019-03-27 RX ORDER — MAGNESIUM CITRATE
296 SOLUTION, ORAL ORAL 2 TIMES DAILY
Status: COMPLETED | OUTPATIENT
Start: 2019-03-27 | End: 2019-03-28

## 2019-03-27 RX ORDER — ONDANSETRON 2 MG/ML
4 INJECTION INTRAMUSCULAR; INTRAVENOUS
Status: DISCONTINUED | OUTPATIENT
Start: 2019-03-27 | End: 2019-03-30 | Stop reason: HOSPADM

## 2019-03-27 RX ORDER — IBUPROFEN 400 MG/1
800 TABLET ORAL EVERY 8 HOURS
Status: DISCONTINUED | OUTPATIENT
Start: 2019-03-27 | End: 2019-03-30 | Stop reason: HOSPADM

## 2019-03-27 RX ADMIN — IBUPROFEN 800 MG: 400 TABLET ORAL at 16:20

## 2019-03-27 RX ADMIN — HYDROCODONE BITARTRATE AND ACETAMINOPHEN 1 TABLET: 7.5; 325 TABLET ORAL at 22:57

## 2019-03-27 RX ADMIN — Medication 10 ML: at 22:58

## 2019-03-27 RX ADMIN — IBUPROFEN 800 MG: 400 TABLET ORAL at 22:57

## 2019-03-27 RX ADMIN — ONDANSETRON 4 MG: 2 INJECTION INTRAMUSCULAR; INTRAVENOUS at 23:04

## 2019-03-27 RX ADMIN — Medication 10 ML: at 15:36

## 2019-03-27 RX ADMIN — GABAPENTIN 600 MG: 300 CAPSULE ORAL at 22:57

## 2019-03-27 RX ADMIN — GABAPENTIN 600 MG: 300 CAPSULE ORAL at 18:25

## 2019-03-27 RX ADMIN — MAGNESIUM CITRATE 296 ML: 1.75 LIQUID ORAL at 18:26

## 2019-03-27 NOTE — CONSULTS
[facility] Consult Note       Primary GI Physician: Dr. Edel Marino    Referring Provider:  Rukhsana Powell NP    Consult Date:  3/27/2019    Admit Date:  3/27/2019    Chief Complaint:  Stool ball    Subjective:     History of Present Illness:  Patient is a 34 y.o. female with PMH of spinal cord injury resulting in paraplegia, HTN, migraines, GERD, PUD, chronic constipation, ADD, neurogenic bladder, autonomic dysreflexia, s/p spinal cord stimulator, hx of H pylori s/p treatment with JOHN VelazquezO, who is seen in consultation at the request of Dr. Oleg Colin for stool ball. She has had history of chronic constipation since spinal cord injury and is on Linzess daily and a bowel regimen Monday, Wednesday, Friday of Miralax and enemas. She has recently been having increased symptoms over the past couple of weeks with no response to the bowel regimen, other than passing brown watery liquid, and having increased diffuse cramping abd pain, nausea, and diffuse bloating over her abdomen. She became concerned she may have another obstruction, and was seen in the ER and at our office recently, with KUB noting constipation, but no obstruction on 22 March 2019. She has tried Miralax, fleet enema, soap suds enema, and Suprep with no relief. She was seen again in our office on 25 March 2019 and sent for CT Enterography, which she had on 26 March 2019 and it noted enteritis, but no bowel obstruction. She has had persistent symptoms and began vomiting overnight. She contacted surgery and was directly admitted for concern for impaction. She denies any heartburn, chest pain, chronic cough, hoarseness, or difficulty swallowing. Her last colonoscopy was in 2013 with poor prep, hemorrhoids, and mild proctitis with negative bxs. Dr. Oleg Colin performed a rectal exam today at bedside and felt little to no stool in the rectal vault, but noted a firm anterior mass at 10 cm or posterior uterus.     PMH:  Past Medical History:   Diagnosis Date    Abdominal pain     ADD (attention deficit disorder)     Anxiety     Back pain     Chronic cystitis 4/3/2014    Closed fracture of left fibula and tibia     Depression     Edema     Flank pain     Fusion of spine, cervical region     GERD (gastroesophageal reflux disease)     Gross hematuria     Hand deformities, acquired     Hand pain     Herpes simplex     Incontinence of urine in female     Left ankle pain     Neurogenic bladder 4/3/2014    Neurogenic bladder     Neurogenic bladder, NOS     Neuropathy     Obesity     Other chronic cystitis     Paraplegia (HCC)     Quadriplegia ([facility] [address]     Urinary tract infection, site not specified      EGD 27 April 2012 with Dr. Jj aNranjo with gastric ulcer, H pylori positive s/p treatment with Prevpak. Colonoscopy 27 April 2012 with Dr. Jj Naranjo with solitary rectal ulcer, otherwise normal.    Colonoscopy 18 Feb 2013 with Dr. Jj Naranjo with poor prep, mild proctitis - negative bx, hemorrhoids. EGD July 2015 with Dr. Jj Naranjo with normal exam.    PSH:  Past Surgical History:   Procedure Laterality Date    HX ORTHOPAEDIC  1994    spinal fusion    HX ORTHOPAEDIC      HX OTHER SURGICAL      implanted stimulator with 8 electrodes       Allergies: Allergies   Allergen Reactions    Betadine [Povidone-Iodine] Hives    Pcn [Penicillins] Unknown (comments)     Patient had reaction during childhood that she believes caused her to lose temporary feeling in her legs. Home Medications:  Prior to Admission medications    Medication Sig Start Date End Date Taking?  Authorizing Provider   gabapentin (NEURONTIN) 600 mg tablet  7/10/18   Provider, Historical   ciprofloxacin HCl (CIPRO) 250 mg tablet take 1 tablet by mouth twice a day for 14 days 7/17/18   Provider, Historical   HYDROcodone-acetaminophen (NORCO) 7.5-325 mg per tablet take 1 tablet by mouth three times a day 7/13/18   Provider, Historical   ciprofloxacin HCl (CIPRO) 500 mg tablet Take 1 Tab by mouth two (2) times a day. 9/18/18   Leia Cortez MD   HYDROcodone-acetaminophen [facility]) 7.5-325 mg per tablet Take 1 Tab by mouth every eight (8) hours. Max Daily Amount: 3 Tabs. 3/15/18   Alto Gaby., MD   HYDROcodone-acetaminophen [facility]) 7.5-325 mg per tablet Take 1 Tab by mouth every eight (8) hours. Max Daily Amount: 3 Tabs. 2/13/18   Alto Gaby., MD   HYDROcodone-acetaminophen [facility]) 7.5-325 mg per tablet Take 1 Tab by mouth every eight (8) hours. Max Daily Amount: 3 Tabs. 4/14/18   [place] Gaby., MD   naloxegol (MOVANTIK) 25 mg tab tablet Take 1 Tab by mouth Daily (before breakfast). 1/18/18   Alto Gaby., MD   traMADol [facility]) 50 mg tablet Take 1 Tab by mouth every six (6) hours as needed for Pain. Max Daily Amount: 200 mg. 1/18/18   Alto Gaby., MD   gabapentin (NEURONTIN) 600 mg tablet Take 1 Tab by mouth three (3) times daily. 10/9/17   Alto Gaby., MD   acyclovir (ZOVIRAX) 400 mg tablet Take 1 Tab by mouth three (3) times daily. For 5 days 10/9/17   Galilea Griffin., MD   ibuprofen (MOTRIN) 800 mg tablet Take 1 Tab by mouth every eight (8) hours.  2/17/17   Galilea Griffin., MD       Hospital Medications:  Current Facility-Administered Medications   Medication Dose Route Frequency    sodium chloride (NS) flush 5-40 mL  5-40 mL IntraVENous Q8H    sodium chloride (NS) flush 5-40 mL  5-40 mL IntraVENous PRN    ondansetron (ZOFRAN) injection 4 mg  4 mg IntraVENous Q4H PRN       Social History:  Social History     Tobacco Use    Smoking status: Never Smoker    Smokeless tobacco: Never Used   Substance Use Topics    Alcohol use: Yes     Comment: weekends       Family History:  Family History   Problem Relation Age of Onset    Heart Disease Mother     Hypertension Mother     Coronary Artery Disease Mother     Cancer Other         gen fam HX    Diabetes Other         gen fam HX    High Cholesterol Other         gen fam HX    Breast Cancer Maternal Aunt     Coronary Artery Disease Maternal Uncle     Heart Disease Maternal Grandmother     Coronary Artery Disease Maternal Grandmother     Heart Disease Maternal Grandfather     Coronary Artery Disease Maternal Grandfather        Review of Systems:  A detailed 10 system ROS is obtained, with pertinent positives as listed above. All others are negative. Diet:  NPO    Objective:     Physical Exam:  Vitals:  Visit Vitals  /70   Pulse 77   Temp 97.6 °F (36.4 °C)   Resp 16   SpO2 98%     Gen:  Pt is alert, cooperative, no acute distress, lying in bed  Skin:  Extremities and face reveal no rashes. HEENT: Sclerae anicteric. Extra-occular muscles are intact. No oral ulcers. No abnormal pigmentation of the lips. The neck is supple. Cardiovascular: Regular rate and rhythm. No murmurs, gallops, or rubs. Respiratory:  Comfortable breathing with no accessory muscle use. Clear breath sounds anteriorly with no wheezes, rales, or rhonchi. GI:  Abdomen some distention but soft, and mildly tender. Normal active bowel sounds. No enlargement of the liver or spleen. No masses palpable. Rectal:  Deferred  Musculoskeletal:  No pitting edema of the lower legs. Neurological:  Gross memory appears intact. Patient is alert and oriented. Psychiatric:  Mood appears appropriate with judgement intact. Lymphatic:  No cervical or supraclavicular adenopathy. Laboratory:    Recent Labs     03/27/19  1415   WBC 9.6   HGB 12.6   HCT 38.0      MCV 94.1      K 4.0   *   CO2 22   BUN 10   CREA 0.47*   CA 8.5   GLU 87     THREE-VIEW ACUTE ABDOMINAL SERIES 22 March 2019  CLINICAL HISTORY:  Abdominal pain with difficult bowel movements in a  34year-old partial quadriplegic with no history of abdominal surgery.    COMPARISON:  CT of April 10, 2017.   CHEST: AP erect image demonstrates no confluent infiltrate or significant  pleural fluid, and the heart size is within normal limits without evidence of  congestive heart failure or pneumothorax. The bony thorax appears intact.   ABDOMEN:  Supine and erect images demonstrate no free intraperitoneal air. There is a moderate amount of stool in the right colon but only scattered stool  in the distal transverse and left colon. There is gas in several loops of  nondilated small bowel in a nonspecific pattern. There are no findings  suggestive of significant small bowel obstruction at this time. No abnormal  soft tissue mass or calcific density is noted. There are overlying radiopaque  support devices.   IMPRESSION  IMPRESSION:     1. Nonspecific bowel gas pattern with a moderate amount of right colonic stool.   2.  No acute cardiopulmonary disease identified.     This case was reviewed in consultation with colleagues. The results were  discussed by telephone with Dr. Quiana Angela at 11:46 hours and immediately  thereafter with Dr. Stewart Roque in the ER. CT Enterography 26 March 2019  INDICATION:  70-year-old female with generalized abdominal pain and nausea.    COMPARISON EXAMS: CT abdomen pelvis dated 4/10/2017    PROCEDURE: Axial imaging from chest base through the pelvis. There is no oral  contrast. There is 100mL Isovue-370 given intravenously.   FINDINGS:   CHEST BASE: Lung bases are clear of any focal infiltrate. Stable appearance to a  small bilateral pleural effusions. Scattered areas of fibrosis identified in the  bilateral lung bases. No focus of consolidation.   LIVER, PANCREAS, SPLEEN: Unremarkable   GALLBLADDER:  The gallbladder is slightly contracted time examination. No stone or sludge  formation. No biliary ductal dilatation   BOWEL AND MESENTERY: No free air or free fluid. Bowel is normal in caliber. No  mesenteric adenopathy  The appendix is unremarkable. Mild degree of thickening of the proximal small bowel distal to ligament of  Treitz. No focal inflammatory changes in the surrounding mesentery.  Mild degree  of focal enteritis is suggested. Correlate these findings with patient's  clinical history   AORTA AND RETROPERITONEUM: Normal course and caliber of aorta. No  retroperitoneal adenopathy   KIDNEYS AND ADRENAL GLANDS: Bilateral adrenal glands unremarkable. No  hydronephrosis or nephrolithiasis.   PELVIS   : Bladder contour is unremarkable. Fluid identified within the endometrium. Correlate to patient's history of any recent menstrual cycles.    BOWEL AND MESENTERY: No pelvic free air or free fluid. Bowel is normal in caliber. No pelvic or inguinal adenopathy   MSK AND SOFT TISSUES: Normal vertebral body height and alignment   IMPRESSION  IMPRESSION:  Mild degree of small bowel wall thickening of the proximal small bowel just  distal to ligament of Treitz with no focal infiltrate changes surrounding  mesentery. Correlate for any focal enteritis. Remainder of exam is unremarkable      Assessment:     Active Problems:    Impaction of colon ([address] (3/27/2019)    33 yo female with PMH of spinal cord injury resulting in paraplegia, HTN, migraines, GERD, PUD, chronic constipation, ADD, neurogenic bladder, autonomic dysreflexia, s/p spinal cord stimulator, hx of H pylori s/p treatment with Prevpak, SBO, who is seen in consultation 27 March 2019 at the request of Dr. Moses Zimmerman for stool ball, who has had increased constipation, abd pain, nausea, and vomiting, and was noted on CT Enterography 26 March 2019 to have enteritis, but no bowel obstruction. Her last colonoscopy was in 2013 with poor prep, hemorrhoids, and mild proctitis with negative bxs. Dr. Moses Zimmerman performed a rectal exam today at bedside and felt little to no stool in the rectal vault, but noted a firm anterior mass at 10 cm or posterior uterus. Her symptoms may be related to partial or SBO with the enteritis. Also concern for IBD, rectal lesion. Plan:     -Supportive care.   -Continue bowel regimen.  -Recommend further evaluation with an EGD and flexible sigmoidoscopy tomorrow with Dr. Sharlene Caldwell. Discussed risks including bleeding, perforation, IV complications, sedation risks, and pt states understanding and agrees to proceed.   -NPO. -Mag citrate x 2 and enemas x 2 for prep for flex sig.  -Obtain IBD [facility] labs to assess for IBD. -Follow. Patient is seen and examined in collaboration with Dr. Maxwell Hays. Assessment and plan as per Dr. Sharlene Caldwell. [facility]  [facility]

## 2019-03-27 NOTE — H&P
H&P/Consult Note/Progress Note/Office Note:  
Stacy Chaves  MRN: [id number]  CFA:[birth year]  Age:38 y.o. 
 
HPI: Stacy Chaves is a 34 y.o. female who presents with complaints of constipation. She has a history of partial quadriplegia and is on a bowel regimen at home. She has tried several different laxatives, enemas, and Magic bullets without any relief. The patient reports nausea and vomiting as well as crampy abdominal pain. She was seen in the ER on 3/22. GI was consulted and they recommended a continued bowel regimen. She had a CT scan on 3/26 which showed mild degree of small bowel wall thickening of the proximal small bowel just distal to ligament of Treitz with no focal infiltrate changes surrounding mesentery; correlate for any focal enteritis. Her labs are unremarkable. AF, VSS. Last colonoscopy was in 2014. Past Medical History:  
Diagnosis Date  Abdominal pain  ADD (attention deficit disorder)  Anxiety  Back pain  Chronic cystitis 4/3/2014  Closed fracture of left fibula and tibia  Depression  Edema  Flank pain  Fusion of spine, cervical region  GERD (gastroesophageal reflux disease)  Gross hematuria  Hand deformities, acquired  Hand pain  Herpes simplex  Incontinence of urine in female  Left ankle pain  Neurogenic bladder 4/3/2014  Neurogenic bladder  Neurogenic bladder, NOS   
 Neuropathy  Obesity  Other chronic cystitis  Paraplegia ([address]  Quadriplegia ([address]  Urinary tract infection, site not specified Past Surgical History:  
Procedure Laterality Date  HX ORTHOPAEDIC  1994  
 spinal fusion  HX ORTHOPAEDIC    
 HX OTHER SURGICAL    
 implanted stimulator with 8 electrodes Current Facility-Administered Medications Medication Dose Route Frequency  sodium chloride (NS) flush 5-40 mL  5-40 mL IntraVENous Q8H  
  sodium chloride (NS) flush 5-40 mL  5-40 mL IntraVENous PRN  
 ondansetron (ZOFRAN) injection 4 mg  4 mg IntraVENous Q4H PRN  
 . PHARMACY TO SUBSTITUTE PER PROTOCOL (Reordered from: gabapentin (NEURONTIN) 600 mg tablet)    Per Protocol  HYDROcodone-acetaminophen (NORCO) 7.5-325 mg per tablet 1 Tab  1 Tab Oral Q8H  
 ibuprofen (MOTRIN) tablet 800 mg  800 mg Oral Q8H  
 . PHARMACY TO SUBSTITUTE PER PROTOCOL (Reordered from: linaclotide (LINZESS) 290 mcg cap capsule)    Per Protocol Betadine [povidone-iodine] and Pcn [penicillins] Social History Socioeconomic History  Marital status: SINGLE Spouse name: Not on file  Number of children: Not on file  Years of education: Not on file  Highest education level: Not on file Tobacco Use  Smoking status: Never Smoker  Smokeless tobacco: Never Used Substance and Sexual Activity  Alcohol use: Yes Comment: weekends  Drug use: No  
 Sexual activity: Yes  
  Partners: Male Birth control/protection: None Other Topics Concern [address] [place] [address],[address] Yes Social History Narrative ** Merged History Encounter ** Denies physical or sexual abuse Social History Tobacco Use Smoking Status Never Smoker Smokeless Tobacco Never Used Family History Problem Relation Age of Onset  Heart Disease Mother  Hypertension Mother  Coronary Artery Disease Mother  Cancer Other   
     gen fam HX  Diabetes Other   
     gen fam HX  
 High Cholesterol Other   
     gen fam HX  Breast Cancer Maternal Aunt  Coronary Artery Disease Maternal Uncle  Heart Disease Maternal Grandmother  Coronary Artery Disease Maternal Grandmother  Heart Disease Maternal Grandfather  Coronary Artery Disease Maternal Grandfather ROS: The patient has no difficulty with chest pain or shortness of breath. No fever or chills. Comprehensive review of systems was otherwise unremarkable except as noted above. Physical Exam:  
Visit Vitals /72 Pulse 79 Temp 97.4 °F (36.3 °C) Resp 17 SpO2 95% Constitutional: Alert, oriented, cooperative patient in no acute distress; appears stated age Eyes:Sclera are clear. EOMs intact ENMT: no external lesions gross hearing normal; no obvious neck masses, no ear or lip lesions, nares normal 
CV: RRR. Normal perfusion Resp: No JVD. Breathing is  non-labored; no audible wheezing. GI: soft and non-distended, diffuse ttp Rectal: There is little to no stool present in rectal vault. There is a firm anterior mass at about 10cm or posterior uterus. Musculoskeletal: unremarkable with normal function. No embolic signs or cyanosis. Neuro:  Oriented; moves all 4; no focal deficits Psychiatric: normal affect and mood, no memory impairment Recent vitals (if inpt): 
Patient Vitals for the past 24 hrs: 
 BP Temp Pulse Resp SpO2  
03/27/19 1523 110/72 97.4 °F (36.3 °C) 79 17 95 % 03/27/19 1254 107/70 97.6 °F (36.4 °C) 77 16 98 % Labs: 
Recent Labs  
  03/27/19 
1415 WBC 9.6 HGB 12.6    
K 4.0  
* CO2 22 BUN 10  
CREA 0.47* GLU 87 Lab Results Component Value Date/Time WBC 9.6 03/27/2019 02:15 PM  
 HGB 12.6 03/27/2019 02:15 PM  
 PLATELET 262 62/81/4904 02:15 PM  
 Sodium 141 03/27/2019 02:15 PM  
 Potassium 4.0 03/27/2019 02:15 PM  
 Chloride 113 (H) 03/27/2019 02:15 PM  
 CO2 22 03/27/2019 02:15 PM  
 BUN 10 03/27/2019 02:15 PM  
 Creatinine 0.47 (L) 03/27/2019 02:15 PM  
 Glucose 87 03/27/2019 02:15 PM  
 Bilirubin, total 0.3 12/01/2016 09:03 AM  
 Bilirubin, direct 0.09 12/01/2016 09:03 AM  
 AST (SGOT) 13 12/01/2016 09:03 AM  
 ALT (SGPT) 13 12/01/2016 09:03 AM  
 Alk. phosphatase 67 12/01/2016 09:03 AM  
 
 
I reviewed recent labs and recent radiologic studies. CT Results (most recent): 
Results from [facility] Encounter encounter on 03/26/19 CT ENTEROGRAPHY W CONT Narrative INDICATION:  42-year-old female with generalized abdominal pain and nausea. COMPARISON EXAMS: CT abdomen pelvis dated 4/10/2017 PROCEDURE: Axial imaging from chest base through the pelvis. There is no oral 
contrast. There is 100mL Isovue-370 given intravenously. FINDINGS: 
 
CHEST BASE: Lung bases are clear of any focal infiltrate. Stable appearance to a 
small bilateral pleural effusions. Scattered areas of fibrosis identified in the 
bilateral lung bases. No focus of consolidation. LIVER, PANCREAS, SPLEEN: Unremarkable GALLBLADDER: 
The gallbladder is slightly contracted time examination. No stone or sludge 
formation. No biliary ductal dilatation BOWEL AND MESENTERY: No free air or free fluid. Bowel is normal in caliber. No 
mesenteric adenopathy The appendix is unremarkable. Mild degree of thickening of the proximal small bowel distal to ligament of 
Treitz. No focal inflammatory changes in the surrounding mesentery. Mild degree 
of focal enteritis is suggested. Correlate these findings with patient's 
clinical history AORTA AND RETROPERITONEUM: Normal course and caliber of aorta. No 
retroperitoneal adenopathy KIDNEYS AND ADRENAL GLANDS: Bilateral adrenal glands unremarkable. No 
hydronephrosis or nephrolithiasis. PELVIS 
 
: Bladder contour is unremarkable. Fluid identified within the endometrium. Correlate to patient's history of any recent menstrual cycles. BOWEL AND MESENTERY: No pelvic free air or free fluid. Bowel is normal in 
caliber. No pelvic or inguinal adenopathy MSK AND SOFT TISSUES: Normal vertebral body height and alignment Impression IMPRESSION: 
Mild degree of small bowel wall thickening of the proximal small bowel just 
distal to ligament of Treitz with no focal infiltrate changes surrounding 
mesentery. Correlate for any focal enteritis. Remainder of exam is unremarkable I independently reviewed radiology images for studies I described above or studies I have ordered. Admission date (for inpatients): 3/27/2019 * No surgery found *  * No surgery found * ASSESSMENT/PLAN: 
Problem List  Date Reviewed: 12/12/2018 Codes Class Noted * (Principal) Abdominal pain ICD-10-CM: R10.9 ICD-9-CM: 789.00  3/27/2019 Carpal tunnel syndrome on both sides ICD-10-CM: G56.03 
ICD-9-CM: 354.0  9/18/2018 Pain in both wrists ICD-10-CM: M25.531, M25.532 ICD-9-CM: 719.43  9/18/2018 Quadriplegia ([facility] [address] ICD-10-CM: G82.50 ICD-9-CM: 344.00  Unknown Overview Addendum 9/14/2016  3:00 PM by Tej Montoya Post-traumatic Back pain ICD-10-CM: M54.9 ICD-9-CM: 724.5  Unknown ADD (attention deficit disorder) ICD-10-CM: F98.8 ICD-9-CM: 314.00  Unknown Depression ICD-10-CM: F32.9 ICD-9-CM: 311  Unknown Anxiety ICD-10-CM: F41.9 ICD-9-CM: 300.00  Unknown GERD (gastroesophageal reflux disease) ICD-10-CM: K21.9 ICD-9-CM: 530.81  Unknown Paraplegia ([facility] [address] ICD-10-CM: G82.20 ICD-9-CM: 344.1  Unknown Neurogenic bladder ICD-10-CM: N31.9 ICD-9-CM: 596.54  4/3/2014 Chronic cystitis ICD-10-CM: N30.20 ICD-9-CM: 595.2  4/3/2014 Principal Problem: 
  Abdominal pain (3/27/2019) Admit to Dr. Yordy Curtis Consult GI-may need scope IVF Check labs Signed:  Debbie Hays NP

## 2019-03-27 NOTE — CONSULTS
Please see full note by Samreen. Briefly this is a 33 y/o f with constipation and CT showing proximal small intestine thickening. Rectal exam by Dr. Albert Klein suspicious for rectal mass. Need to eval for Crohn's and rule out rectal cancer. Plan is push enteroscopy, flex sig, and [facility] IBD serology.   Rickie Griffin MD

## 2019-03-27 NOTE — PROGRESS NOTES
Rectal exam performed at bedside. There is little to no stool present in rectal vault. There is a firm anterior mass at about 10cm or posterior uterus. Pt will need scope to better evaluate
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
## 2019-03-28 ENCOUNTER — ANESTHESIA (OUTPATIENT)
Dept: ENDOSCOPY | Age: 39
End: 2019-03-28
Payer: COMMERCIAL

## 2019-03-28 PROCEDURE — 77030020263 HC SOL INJ SOD CL0.9% LFCR 1000ML

## 2019-03-28 PROCEDURE — 99218 HC RM OBSERVATION: CPT

## 2019-03-28 PROCEDURE — 76060000032 HC ANESTHESIA 0.5 TO 1 HR: Performed by: INTERNAL MEDICINE

## 2019-03-28 PROCEDURE — 74011000250 HC RX REV CODE- 250: Performed by: SURGERY

## 2019-03-28 PROCEDURE — 74011250636 HC RX REV CODE- 250/636: Performed by: ANESTHESIOLOGY

## 2019-03-28 PROCEDURE — 76040000025: Performed by: INTERNAL MEDICINE

## 2019-03-28 PROCEDURE — 74011250636 HC RX REV CODE- 250/636: Performed by: NURSE PRACTITIONER

## 2019-03-28 PROCEDURE — 77030009426 HC FCPS BIOP ENDOSC BSC -B: Performed by: INTERNAL MEDICINE

## 2019-03-28 PROCEDURE — 74011250636 HC RX REV CODE- 250/636

## 2019-03-28 PROCEDURE — 77030013991 HC SNR POLYP ENDOSC BSC -A: Performed by: INTERNAL MEDICINE

## 2019-03-28 PROCEDURE — 96375 TX/PRO/DX INJ NEW DRUG ADDON: CPT

## 2019-03-28 PROCEDURE — 74011250637 HC RX REV CODE- 250/637: Performed by: NURSE PRACTITIONER

## 2019-03-28 PROCEDURE — 74011250637 HC RX REV CODE- 250/637: Performed by: PHYSICIAN ASSISTANT

## 2019-03-28 PROCEDURE — 74011250636 HC RX REV CODE- 250/636: Performed by: SURGERY

## 2019-03-28 RX ORDER — LIDOCAINE HYDROCHLORIDE 20 MG/ML
INJECTION, SOLUTION EPIDURAL; INFILTRATION; INTRACAUDAL; PERINEURAL AS NEEDED
Status: DISCONTINUED | OUTPATIENT
Start: 2019-03-28 | End: 2019-03-28 | Stop reason: HOSPADM

## 2019-03-28 RX ORDER — SODIUM CHLORIDE, SODIUM LACTATE, POTASSIUM CHLORIDE, CALCIUM CHLORIDE 600; 310; 30; 20 MG/100ML; MG/100ML; MG/100ML; MG/100ML
100 INJECTION, SOLUTION INTRAVENOUS CONTINUOUS
Status: DISCONTINUED | OUTPATIENT
Start: 2019-03-28 | End: 2019-03-29

## 2019-03-28 RX ORDER — SODIUM CHLORIDE 0.9 % (FLUSH) 0.9 %
5-40 SYRINGE (ML) INJECTION EVERY 8 HOURS
Status: DISCONTINUED | OUTPATIENT
Start: 2019-03-28 | End: 2019-03-30 | Stop reason: HOSPADM

## 2019-03-28 RX ORDER — PROPOFOL 10 MG/ML
INJECTION, EMULSION INTRAVENOUS
Status: DISCONTINUED | OUTPATIENT
Start: 2019-03-28 | End: 2019-03-28 | Stop reason: HOSPADM

## 2019-03-28 RX ORDER — LORAZEPAM 0.5 MG/1
0.5 TABLET ORAL
Status: DISCONTINUED | OUTPATIENT
Start: 2019-03-28 | End: 2019-03-30 | Stop reason: HOSPADM

## 2019-03-28 RX ORDER — SODIUM CHLORIDE 9 MG/ML
75 INJECTION, SOLUTION INTRAVENOUS CONTINUOUS
Status: DISCONTINUED | OUTPATIENT
Start: 2019-03-28 | End: 2019-03-29

## 2019-03-28 RX ORDER — PROPOFOL 10 MG/ML
INJECTION, EMULSION INTRAVENOUS AS NEEDED
Status: DISCONTINUED | OUTPATIENT
Start: 2019-03-28 | End: 2019-03-28 | Stop reason: HOSPADM

## 2019-03-28 RX ORDER — SODIUM CHLORIDE 0.9 % (FLUSH) 0.9 %
5-40 SYRINGE (ML) INJECTION AS NEEDED
Status: DISCONTINUED | OUTPATIENT
Start: 2019-03-28 | End: 2019-03-30 | Stop reason: HOSPADM

## 2019-03-28 RX ADMIN — MAGNESIUM CITRATE 296 ML: 1.75 LIQUID ORAL at 09:30

## 2019-03-28 RX ADMIN — LIDOCAINE HYDROCHLORIDE 50 MG: 20 INJECTION, SOLUTION EPIDURAL; INFILTRATION; INTRACAUDAL; PERINEURAL at 14:54

## 2019-03-28 RX ADMIN — Medication 10 ML: at 21:48

## 2019-03-28 RX ADMIN — IBUPROFEN 800 MG: 400 TABLET ORAL at 06:24

## 2019-03-28 RX ADMIN — IBUPROFEN 800 MG: 400 TABLET ORAL at 17:43

## 2019-03-28 RX ADMIN — PROPOFOL 50 MG: 10 INJECTION, EMULSION INTRAVENOUS at 14:54

## 2019-03-28 RX ADMIN — HYDROCODONE BITARTRATE AND ACETAMINOPHEN 1 TABLET: 7.5; 325 TABLET ORAL at 06:24

## 2019-03-28 RX ADMIN — Medication 10 ML: at 16:47

## 2019-03-28 RX ADMIN — HYDROCODONE BITARTRATE AND ACETAMINOPHEN 1 TABLET: 7.5; 325 TABLET ORAL at 17:42

## 2019-03-28 RX ADMIN — LORAZEPAM 0.5 MG: 0.5 TABLET ORAL at 21:45

## 2019-03-28 RX ADMIN — HYDROCODONE BITARTRATE AND ACETAMINOPHEN 1 TABLET: 7.5; 325 TABLET ORAL at 21:48

## 2019-03-28 RX ADMIN — PROPOFOL 140 MCG/KG/MIN: 10 INJECTION, EMULSION INTRAVENOUS at 14:54

## 2019-03-28 RX ADMIN — GABAPENTIN 600 MG: 300 CAPSULE ORAL at 17:43

## 2019-03-28 RX ADMIN — Medication 10 ML: at 06:25

## 2019-03-28 RX ADMIN — GABAPENTIN 600 MG: 300 CAPSULE ORAL at 09:41

## 2019-03-28 RX ADMIN — IBUPROFEN 800 MG: 400 TABLET ORAL at 21:48

## 2019-03-28 RX ADMIN — SODIUM CHLORIDE 75 ML/HR: 900 INJECTION, SOLUTION INTRAVENOUS at 19:38

## 2019-03-28 RX ADMIN — SODIUM CHLORIDE, SODIUM LACTATE, POTASSIUM CHLORIDE, AND CALCIUM CHLORIDE: 600; 310; 30; 20 INJECTION, SOLUTION INTRAVENOUS at 14:53

## 2019-03-28 RX ADMIN — PROMETHAZINE HYDROCHLORIDE 25 MG: 25 INJECTION INTRAMUSCULAR; INTRAVENOUS at 02:28

## 2019-03-28 NOTE — PROCEDURES
Flex sig Procedure Note    Indications: Rectal mass noted on rectal exam    Anesthesia/Sedation: MAC IV     Pre-Procedure Physical:  Current Facility-Administered Medications   Medication Dose Route Frequency    promethazine (PHENERGAN) with saline injection 25 mg  25 mg IntraVENous Q6H PRN    lactated Ringers infusion  100 mL/hr IntraVENous CONTINUOUS    lactated Ringers infusion  100 mL/hr IntraVENous CONTINUOUS    sodium chloride (NS) flush 5-40 mL  5-40 mL IntraVENous Q8H    sodium chloride (NS) flush 5-40 mL  5-40 mL IntraVENous PRN    LORazepam (ATIVAN) tablet 0.5 mg  0.5 mg Oral Q6H PRN    sodium chloride (NS) flush 5-40 mL  5-40 mL IntraVENous Q8H    sodium chloride (NS) flush 5-40 mL  5-40 mL IntraVENous PRN    ondansetron (ZOFRAN) injection 4 mg  4 mg IntraVENous Q4H PRN    gabapentin (NEURONTIN) capsule 600 mg  600 mg Oral TID    HYDROcodone-acetaminophen (NORCO) 7.5-325 mg per tablet 1 Tab  1 Tab Oral Q8H    ibuprofen (MOTRIN) tablet 800 mg  800 mg Oral Q8H    linaclotide (LINZESS) capsule 290 mcg (Patient Supplied)  290 mcg Oral DAILY     Facility-Administered Medications Ordered in Other Encounters   Medication Dose Route Frequency    lidocaine (PF) (XYLOCAINE) 20 mg/mL (2 %) injection   IntraVENous PRN    propofol (DIPRIVAN) 10 mg/mL injection    PRN    propofol (DIPRIVAN) 10 mg/mL injection    CONTINUOUS      Betadine [povidone-iodine] and Pcn [penicillins]    Patient Vitals for the past 8 hrs:   BP Temp Pulse Resp SpO2   03/28/19 1451 95/50  72 18 100 %   03/28/19 1338 91/58  74 16 96 %   03/28/19 1208 91/60 98 °F (36.7 °C) 70 16 98 %   03/28/19 0800 (!) 87/48 98 °F (36.7 °C) 76 16 97 %       Exam:    Airway: clear   Heart: normal S1and S2    Lungs: clear bilateral  Abdomen: soft, nontender, bowel sounds present and normal in all quads   Mental Status: awake, alert and oriented to person, place and time        Procedure Details      Informed consent was obtained for the procedure, including sedation. Risks of perforation, hemorrhage, adverse drug reaction and aspiration were discussed. The patient was placed in the left lateral decubitus position. Based on the pre-procedure assessment, including review of the patient's medical history, medications, allergies, and review of systems, she had been deemed to be an appropriate candidate for conscious sedation; she was therefore sedated with the medications listed below. The patient was monitored continuously with ECG tracing, pulse oximetry, blood pressure monitoring, and direct observations. A rectal examination was performed. The colonoscope was inserted into the rectum and advanced under direct vision to the sigmoid. The quality of the colonic preparation was good. A careful inspection was made as the colonoscope was withdrawn, including a retroflexed view of the rectum; findings and interventions are described below. Appropriate photodocumentation was obtained. Findings:   Single 8 mm sessile polyp removed by cold snare from rectum. Specimens: Rectal polyp    Estimated Blood Loss: 0 cc           Complications: None; patient tolerated the procedure well. Attending Attestation: I performed the procedure. Impression:    Single 8 mm sessile polyp removed by cold snare from rectum. Recommendations:   Full colonoscopy as outpt with 2 day prep.

## 2019-03-28 NOTE — ANESTHESIA POSTPROCEDURE EVALUATION
Procedure(s): ESOPHAGOGASTRODUODENOSCOPY (EGD) WITH PUSH/ 25/ 201 SIGMOIDOSCOPY FLEXIBLE 
ESOPHAGOGASTRODUODENAL (EGD) BIOPSY ENDOSCOPIC POLYPECTOMY. total IV anesthesia Anesthesia Post Evaluation Patient location during evaluation: PACU Patient participation: complete - patient participated Level of consciousness: awake Pain management: satisfactory to patient Airway patency: patent Anesthetic complications: no 
Cardiovascular status: hemodynamically stable Respiratory status: spontaneous ventilation Hydration status: euvolemic Post anesthesia nausea and vomiting:  none Vitals Value Taken Time /53 3/28/2019  3:41 PM  
Temp 36.6 °C (97.8 °F) 3/28/2019  3:22 PM  
Pulse 68 3/28/2019  3:42 PM  
Resp 15 3/28/2019  3:31 PM  
SpO2 97 % 3/28/2019  3:42 PM  
Vitals shown include unvalidated device data.

## 2019-03-28 NOTE — PROGRESS NOTES
END OF SHIFT NOTE: 
 
INTAKE/OUTPUT 
03/27 0701 - 03/28 0700 In: -  
Out: 700 [Urine:700] Voiding: YES Catheter: YES; self cath Drain:   
 
 
 
 
 
Flatus: Patient does have flatus present. Stool:  2 occurrences. Characteristics: 
  
Emesis: 0 occurrences. Characteristics: VITAL SIGNS Patient Vitals for the past 12 hrs: 
 Temp Pulse Resp BP SpO2  
03/28/19 0340    97/47   
03/28/19 0320 97.4 °F (36.3 °C) 76 17 (!) 83/51 98 % 03/27/19 2351 98 °F (36.7 °C) 74 17 106/67 95 % Pain Assessment Pain Intensity 1: 0 (03/28/19 0427) Pain Location 1: Abdomen, Head 
Pain Intervention(s) 1: Refused Patient Stated Pain Goal: 0 Ambulating No 
 
Shift report given to oncoming nurse at the bedside.  
 
Jonni Kocher, RN

## 2019-03-28 NOTE — PROGRESS NOTES
's initial visit attempted. Ms. Hayley Angel was receiving care from staff. Diane Dominguez MDiv Board Certified [employer]

## 2019-03-28 NOTE — ROUTINE PROCESS
Patient received to endoscopy via stretcher . Denies SOB and pain. Oriented to room and call light. Instructed not to get OOB without assist, report pain/SOB ASAP and report any needs - verbalizes understanding. Consent for procedure obtained. IV access patent. Sitting up in bed in no apparent distress. Family at bedside.

## 2019-03-28 NOTE — ANESTHESIA PREPROCEDURE EVALUATION
Relevant Problems No relevant active problems Anesthetic History No history of anesthetic complications Review of Systems / Medical History Patient summary reviewed and pertinent labs reviewed Pulmonary Within defined limits Neuro/Psych Psychiatric history Comments: ADD Neurogenic bladder C6 quadriplegia after spine fx surgery Cardiovascular Exercise tolerance: <4 METS 
  
GI/Hepatic/Renal 
  
GERD Comments: Admitted with constipation/abd pain Endo/Other Other Findings Comments: Constipation and abdominal pain. Possible rectal mass on rectal exam.  Normal hgb. Physical Exam 
 
Airway Mallampati: II 
TM Distance: 4 - 6 cm Neck ROM: decreased range of motion Mouth opening: Normal 
 
 Cardiovascular Rhythm: regular Rate: normal 
 
 
 
 Dental 
No notable dental hx Pulmonary Breath sounds clear to auscultation Abdominal 
 
 
 
 Other Findings Anesthetic Plan ASA: 3 Anesthesia type: total IV anesthesia Anesthetic plan and risks discussed with: Patient and Mother

## 2019-03-28 NOTE — PROGRESS NOTES
. 
[facility] Progress Note Date: 3/28/2019 GI Problem: proximal small intestine thickening, abnormal rectal exam 
 
History of Present Illness:  Patient is a 34 y.o. female who is seen in consultation for proximal small intestine thickening, abnormal rectal exam. Hospital Medications: 
Current Facility-Administered Medications Medication Dose Route Frequency  promethazine (PHENERGAN) with saline injection 25 mg  25 mg IntraVENous Q6H PRN  
 lactated Ringers infusion  100 mL/hr IntraVENous CONTINUOUS  
 lactated Ringers infusion  100 mL/hr IntraVENous CONTINUOUS  
 sodium chloride (NS) flush 5-40 mL  5-40 mL IntraVENous Q8H  
 sodium chloride (NS) flush 5-40 mL  5-40 mL IntraVENous PRN  
 LORazepam (ATIVAN) tablet 0.5 mg  0.5 mg Oral Q6H PRN  
 sodium chloride (NS) flush 5-40 mL  5-40 mL IntraVENous Q8H  
 sodium chloride (NS) flush 5-40 mL  5-40 mL IntraVENous PRN  
 ondansetron (ZOFRAN) injection 4 mg  4 mg IntraVENous Q4H PRN  
 gabapentin (NEURONTIN) capsule 600 mg  600 mg Oral TID  
 HYDROcodone-acetaminophen (NORCO) 7.5-325 mg per tablet 1 Tab  1 Tab Oral Q8H  
 ibuprofen (MOTRIN) tablet 800 mg  800 mg Oral Q8H  
 linaclotide (LINZESS) capsule 290 mcg (Patient Supplied)  290 mcg Oral DAILY Objective:  
 
Physical Exam: 
Vitals: 
Visit Vitals BP 91/58 Pulse 74 Temp 98 °F (36.7 °C) Resp 16 SpO2 96% General: No acute distress. Skin:  Extremities and face reveal no rashes. No daniel erythema. No telangiectasias on the chest wall. HEENT: Sclerae anicteric. No oral ulcers. No abnormal pigmentation of the lips. The neck is supple. Cardiovascular: Regular rate and rhythm. No murmurs, gallops, or rubs. Respiratory:  Comfortable breathing  With no accessory muscle use. Clear breath sounds with no wheezes, rales, or rhonchi. GI:  Abdomen nondistended, soft, and nontender.   Normal active bowel sounds. No enlargement of the liver or spleen. No masses palpable. Musculoskeletal:  No pitting edema of the lower legs. Extremities have good range of motion. Neurological:  Gross memory appears intact. Patient is alert and oriented. Psychiatric:  Mood appears appropriate with judgement intact. Lymphatic:  No cervical or supraclavicular adenopathy. Laboratory:   
Recent Labs  
  03/27/19 
1415 WBC 9.6  
RBC 4.04* HGB 12.6 HCT 38.0  
 Recent Labs  
  03/27/19 
1415 GLU 87   
K 4.0  
* CO2 22 BUN 10  
CREA 0.47* CA 8.5 No results for input(s): PTP, INR, APTT in the last 72 hours. No lab exists for component: INREXT No results for input(s): TBIL, CBIL, SGOT, GPT, AP, ALB, TP, AML, LPSE in the last 72 hours. Assessment: A 34 y.o. female with proximal small intestine thickening, abnormal rectal exam 
 
 
Plan: Push enteroscopy and flex sig Signed By: Zuleima Dickson MD   
 March 28, 2019

## 2019-03-28 NOTE — PROGRESS NOTES
TRANSFER - IN REPORT: 
 
Verbal report received from Federico(name) on Chastity Conception Figures  being received from [facility](unit) for routine progression of care Report consisted of patients Situation, Background, Assessment and  
Recommendations(SBAR). Information from the following report(s) SBAR and Kardex was reviewed with the receiving nurse. Opportunity for questions and clarification was provided. Assessment completed upon patients arrival to unit and care assumed.

## 2019-03-28 NOTE — PROGRESS NOTES
H&P/Consult Note/Progress Note/Office Note:  
Rita Bradley  MRN: [id number]  YVR:[id number]  Age:38 y.o. 
 
HPI: Rita Bradley is a 34 y.o. female who presents with complaints of constipation. She has a history of partial quadriplegia and is on a bowel regimen at home. She has tried several different laxatives, enemas, and Magic bullets without any relief. The patient reports nausea and vomiting as well as crampy abdominal pain. She was seen in the ER on 3/22. GI was consulted and they recommended a continued bowel regimen. She had a CT scan on 3/26 which showed mild degree of small bowel wall thickening of the proximal small bowel just distal to ligament of Treitz with no focal infiltrate changes surrounding mesentery; correlate for any focal enteritis. Her labs are unremarkable. AF, VSS. Last colonoscopy was in 2014. 
 
3/28/19: Awake in bed. Reports small amount of soft BM last night. Plans for flex sig and push enteroscopy with GI today. Past Medical History:  
Diagnosis Date  Abdominal pain  ADD (attention deficit disorder)  Anxiety  Back pain  Chronic cystitis 4/3/2014  Closed fracture of left fibula and tibia  Depression  Edema  Flank pain  Fusion of spine, cervical region  GERD (gastroesophageal reflux disease)  Gross hematuria  Hand deformities, acquired  Hand pain  Herpes simplex  Incontinence of urine in female  Left ankle pain  Neurogenic bladder 4/3/2014  Neurogenic bladder  Neurogenic bladder, NOS   
 Neuropathy  Obesity  Other chronic cystitis  Paraplegia ([address]  Quadriplegia ([address]  Urinary tract infection, site not specified Past Surgical History:  
Procedure Laterality Date  HX ORTHOPAEDIC  1994  
 spinal fusion  HX ORTHOPAEDIC    
 HX OTHER SURGICAL    
 implanted stimulator with 8 electrodes Current Facility-Administered Medications Medication Dose Route Frequency  promethazine (PHENERGAN) with saline injection 25 mg  25 mg IntraVENous Q6H PRN  
 sodium chloride (NS) flush 5-40 mL  5-40 mL IntraVENous Q8H  
 sodium chloride (NS) flush 5-40 mL  5-40 mL IntraVENous PRN  
 ondansetron (ZOFRAN) injection 4 mg  4 mg IntraVENous Q4H PRN  
 gabapentin (NEURONTIN) capsule 600 mg  600 mg Oral TID  
 HYDROcodone-acetaminophen (NORCO) 7.5-325 mg per tablet 1 Tab  1 Tab Oral Q8H  
 ibuprofen (MOTRIN) tablet 800 mg  800 mg Oral Q8H  
 linaclotide (LINZESS) capsule 290 mcg (Patient Supplied)  290 mcg Oral DAILY Betadine [povidone-iodine] and Pcn [penicillins] Social History Socioeconomic History  Marital status: SINGLE Spouse name: Not on file  Number of children: Not on file  Years of education: Not on file  Highest education level: Not on file Tobacco Use  Smoking status: Never Smoker  Smokeless tobacco: Never Used Substance and Sexual Activity  Alcohol use: Yes Comment: weekends  Drug use: No  
 Sexual activity: Yes  
  Partners: Male Birth control/protection: None Other Topics Concern [address] [facility] Yes Social History Narrative ** Merged History Encounter ** Denies physical or sexual abuse Social History Tobacco Use Smoking Status Never Smoker Smokeless Tobacco Never Used Family History Problem Relation Age of Onset  Heart Disease Mother  Hypertension Mother  Coronary Artery Disease Mother  Cancer Other   
     gen fam HX  Diabetes Other   
     gen fam HX  
 High Cholesterol Other   
     gen fam HX  Breast Cancer Maternal Aunt  Coronary Artery Disease Maternal Uncle  Heart Disease Maternal Grandmother  Coronary Artery Disease Maternal Grandmother  Heart Disease Maternal Grandfather  Coronary Artery Disease Maternal Grandfather ROS: The patient has no difficulty with chest pain or shortness of breath. No fever or chills. Comprehensive review of systems was otherwise unremarkable except as noted above. Physical Exam:  
Visit Vitals BP (!) 87/48 Pulse 76 Temp 98 °F (36.7 °C) Resp 16 SpO2 97% Constitutional: Alert, oriented, cooperative patient in no acute distress; appears stated age Eyes:Sclera are clear. EOMs intact ENMT: no external lesions gross hearing normal; no obvious neck masses, no ear or lip lesions, nares normal 
CV: RRR. Normal perfusion Resp: No JVD. Breathing is  non-labored; no audible wheezing. GI: soft and non-distended, diffuse ttp Rectal: There is little to no stool present in rectal vault. There is a firm anterior mass at about 10cm or posterior uterus. Musculoskeletal: unremarkable with normal function. No embolic signs or cyanosis. Neuro:  Oriented; moves all 4; no focal deficits Psychiatric: normal affect and mood, no memory impairment Recent vitals (if inpt): 
Patient Vitals for the past 24 hrs: 
 BP Temp Pulse Resp SpO2  
03/28/19 0800 (!) 87/48 98 °F (36.7 °C) 76 16 97 % 03/28/19 0340 97/47      
03/28/19 0320 (!) 83/51 97.4 °F (36.3 °C) 76 17 98 % 03/27/19 2351 106/67 98 °F (36.7 °C) 74 17 95 % 03/27/19 1906 108/64 97.9 °F (36.6 °C) 75 18 97 % 03/27/19 1523 110/72 97.4 °F (36.3 °C) 79 17 95 % 03/27/19 1254 107/70 97.6 °F (36.4 °C) 77 16 98 % Labs: 
Recent Labs  
  03/27/19 
1415 WBC 9.6 HGB 12.6    
K 4.0  
* CO2 22 BUN 10  
CREA 0.47* GLU 87 Lab Results Component Value Date/Time  WBC 9.6 03/27/2019 02:15 PM  
 HGB 12.6 03/27/2019 02:15 PM  
 PLATELET 156 45/23/3772 02:15 PM  
 Sodium 141 03/27/2019 02:15 PM  
 Potassium 4.0 03/27/2019 02:15 PM  
 Chloride 113 (H) 03/27/2019 02:15 PM  
 CO2 22 03/27/2019 02:15 PM  
 BUN 10 03/27/2019 02:15 PM  
 Creatinine 0.47 (L) 03/27/2019 02:15 PM  
 Glucose 87 03/27/2019 02:15 PM  
 Bilirubin, total 0.3 12/01/2016 09:03 AM  
 Bilirubin, direct 0.09 12/01/2016 09:03 AM  
 AST (SGOT) 13 12/01/2016 09:03 AM  
 ALT (SGPT) 13 12/01/2016 09:03 AM  
 Alk. phosphatase 67 12/01/2016 09:03 AM  
 
 
I reviewed recent labs and recent radiologic studies. CT Results (most recent): 
[place] from [facility] [place] 03/26/19 CT ENTEROGRAPHY W CONT Narrative INDICATION:  47-year-old female with generalized abdominal pain and nausea. COMPARISON EXAMS: CT abdomen pelvis dated 4/10/2017 PROCEDURE: Axial imaging from chest base through the pelvis. There is no oral 
contrast. There is 100mL Isovue-370 given intravenously. FINDINGS: 
 
CHEST BASE: Lung bases are clear of any focal infiltrate. Stable appearance to a 
small bilateral pleural effusions. Scattered areas of fibrosis identified in the 
bilateral lung bases. No focus of consolidation. LIVER, PANCREAS, SPLEEN: Unremarkable GALLBLADDER: 
The gallbladder is slightly contracted time examination. No stone or sludge 
formation. No biliary ductal dilatation BOWEL AND MESENTERY: No free air or free fluid. Bowel is normal in caliber. No 
mesenteric adenopathy The appendix is unremarkable. Mild degree of thickening of the proximal small bowel distal to ligament of 
Treitz. No focal inflammatory changes in the surrounding mesentery. Mild degree 
of focal enteritis is suggested. Correlate these findings with patient's 
clinical history AORTA AND RETROPERITONEUM: Normal course and caliber of aorta. No 
retroperitoneal adenopathy KIDNEYS AND ADRENAL GLANDS: Bilateral adrenal glands unremarkable. No 
hydronephrosis or nephrolithiasis. PELVIS 
 
: Bladder contour is unremarkable. Fluid identified within the endometrium. Correlate to patient's history of any recent menstrual cycles. BOWEL AND MESENTERY: No pelvic free air or free fluid. Bowel is normal in 
caliber. No pelvic or inguinal adenopathy MSK AND SOFT TISSUES: Normal vertebral body height and alignment Impression IMPRESSION: 
Mild degree of small bowel wall thickening of the proximal small bowel just 
distal to ligament of Treitz with no focal infiltrate changes surrounding 
mesentery. Correlate for any focal enteritis. Remainder of exam is unremarkable I independently reviewed radiology images for studies I described above or studies I have ordered. Admission date (for inpatients): 3/27/2019 * No surgery found *  Procedure(s): ESOPHAGOGASTRODUODENOSCOPY (EGD) WITH PUSH/ 25/ 201 SIGMOIDOSCOPY FLEXIBLE 
 
ASSESSMENT/PLAN: 
Problem List  Date Reviewed: 12/12/2018 Codes Class Noted * (Principal) Abdominal pain ICD-10-CM: R10.9 ICD-9-CM: 789.00  3/27/2019 Carpal tunnel syndrome on both sides ICD-10-CM: G56.03 
ICD-9-CM: 354.0  9/18/2018 Pain in both wrists ICD-10-CM: M25.531, M25.532 ICD-9-CM: 719.43  9/18/2018 Quadriplegia ([facility] ICD-10-CM: G82.50 ICD-9-CM: 344.00  Unknown Overview Addendum 9/14/2016  3:00 PM by Cindy Wood Post-traumatic Back pain ICD-10-CM: M54.9 ICD-9-CM: 724.5  Unknown ADD (attention deficit disorder) ICD-10-CM: F98.8 ICD-9-CM: 314.00  Unknown Depression ICD-10-CM: F32.9 ICD-9-CM: 311  Unknown Anxiety ICD-10-CM: F41.9 ICD-9-CM: 300.00  Unknown GERD (gastroesophageal reflux disease) ICD-10-CM: K21.9 ICD-9-CM: 530.81  Unknown Paraplegia ([facility] ICD-10-CM: G82.20 ICD-9-CM: 344.1  Unknown Neurogenic bladder ICD-10-CM: N31.9 ICD-9-CM: 596.54  4/3/2014 Chronic cystitis ICD-10-CM: N30.20 ICD-9-CM: 595.2  4/3/2014 Principal Problem: 
  Abdominal pain (3/27/2019) Continue present care NPO 
GI following [email or web address] Check labs Signed:  Garima Man NP

## 2019-03-28 NOTE — PROGRESS NOTES
TRANSFER - OUT REPORT: 
 
Verbal report given to Estefani Elizalde on Underwood Liter  being transferred to [facility] for ordered procedure Report consisted of patients Situation, Background, Assessment and  
Recommendations(SBAR). Information from the following report(s) SBAR and Procedure Summary was reviewed with the receiving nurse. Lines:  
Peripheral IV 03/27/19 Right Antecubital (Active) Site Assessment Clean, dry, & intact 3/28/2019  8:00 AM  
Phlebitis Assessment 0 3/28/2019  8:00 AM  
Infiltration Assessment 0 3/28/2019  8:00 AM  
Dressing Status Clean, dry, & intact 3/28/2019  8:00 AM  
Dressing Type Transparent 3/28/2019  8:00 AM  
Hub Color/Line Status Blue;Flushed;Patent 3/28/2019  8:00 AM  
  
 
Opportunity for questions and clarification was provided. Patient transported with: 
 [employer]

## 2019-03-28 NOTE — PROCEDURES
Push Enteroscopy Procedure Note    Indications: Proximal small intestine thickening on CT scan    Anesthesia/Sedation: MAC IV     Pre-Procedure Physical:    Current Facility-Administered Medications   Medication Dose Route Frequency    promethazine (PHENERGAN) with saline injection 25 mg  25 mg IntraVENous Q6H PRN    lactated Ringers infusion  100 mL/hr IntraVENous CONTINUOUS    lactated Ringers infusion  100 mL/hr IntraVENous CONTINUOUS    sodium chloride (NS) flush 5-40 mL  5-40 mL IntraVENous Q8H    sodium chloride (NS) flush 5-40 mL  5-40 mL IntraVENous PRN    LORazepam (ATIVAN) tablet 0.5 mg  0.5 mg Oral Q6H PRN    sodium chloride (NS) flush 5-40 mL  5-40 mL IntraVENous Q8H    sodium chloride (NS) flush 5-40 mL  5-40 mL IntraVENous PRN    ondansetron (ZOFRAN) injection 4 mg  4 mg IntraVENous Q4H PRN    gabapentin (NEURONTIN) capsule 600 mg  600 mg Oral TID    HYDROcodone-acetaminophen (NORCO) 7.5-325 mg per tablet 1 Tab  1 Tab Oral Q8H    ibuprofen (MOTRIN) tablet 800 mg  800 mg Oral Q8H    linaclotide (LINZESS) capsule 290 mcg (Patient Supplied)  290 mcg Oral DAILY      Betadine [povidone-iodine] and Pcn [penicillins]    Patient Vitals for the past 8 hrs:   BP Temp Pulse Resp SpO2   03/28/19 1338 91/58  74 16 96 %   03/28/19 1208 91/60 98 °F (36.7 °C) 70 16 98 %   03/28/19 0800 (!) 87/48 98 °F (36.7 °C) 76 16 97 %       Exam      Airway: clear   Heart: normal S1and S2    Lungs: clear bilateral  Abdomen: soft, nontender, bowel sounds present and normal in all quads   Mental Status: awake, alert and oriented to person, place and time          Procedure Details     Informed consent was obtained for the procedure, including conscious sedation. Risks of pancreatitis, infection, perforation, hemorrhage, adverse drug reaction and aspiration were discussed. The patient was placed in the left lateral decubitus position.   Based on the pre-procedure assessment, including review of the patient's medical history, medications, allergies, and review of systems, she had been deemed to be an appropriate candidate for conscious sedation; she was therefore sedated with the medications listed below. She was monitored continuously with ECG tracing, pulse oximetry, blood pressure monitoring, and direct observation. The Peds colonoscope was inserted into the mouth and advanced under direct vision to the jejunum. A careful inspection was made as the scope was withdrawn, including a retroflexed view of the proximal stomach; findings and interventions are described below. Appropriate photodocumentation was obtained. Findings:   Esophagus- Normal.  Stomach- Normal.  Duodenum- Normal.  Jejunum- Normal.    Therapies: Biopsy    Specimens: Jejunal    Estimated Blood Loss: 0 cc           Complications:   None; patient tolerated the procedure well. Attending Attestation:  I performed the procedure.      Impression:    Normal push enteroscopy  Random jejunal biopsy taken    Recommendations:  Await path results

## 2019-03-28 NOTE — PROGRESS NOTES
TRANSFER - IN REPORT: 
 
Verbal report received from Federico(name) on Michelle Barrios  being received from [facility](unit) for routine progression of care Report consisted of patients Situation, Background, Assessment and  
Recommendations(SBAR). Information from the following report(s) SBAR and Kardex was reviewed with the receiving nurse. Opportunity for questions and clarification was provided. Assessment completed upon patients arrival to unit and care assumed.

## 2019-03-29 PROCEDURE — 94760 N-INVAS EAR/PLS OXIMETRY 1: CPT

## 2019-03-29 PROCEDURE — 99218 HC RM OBSERVATION: CPT

## 2019-03-29 PROCEDURE — 74011250637 HC RX REV CODE- 250/637: Performed by: INTERNAL MEDICINE

## 2019-03-29 PROCEDURE — 96376 TX/PRO/DX INJ SAME DRUG ADON: CPT

## 2019-03-29 PROCEDURE — 74011250636 HC RX REV CODE- 250/636: Performed by: NURSE PRACTITIONER

## 2019-03-29 PROCEDURE — 74011250637 HC RX REV CODE- 250/637: Performed by: NURSE PRACTITIONER

## 2019-03-29 PROCEDURE — 88305 TISSUE EXAM BY PATHOLOGIST: CPT

## 2019-03-29 PROCEDURE — 74011000250 HC RX REV CODE- 250: Performed by: SURGERY

## 2019-03-29 PROCEDURE — 74011250636 HC RX REV CODE- 250/636: Performed by: SURGERY

## 2019-03-29 PROCEDURE — 74011000250 HC RX REV CODE- 250: Performed by: NURSE PRACTITIONER

## 2019-03-29 RX ORDER — POLYETHYLENE GLYCOL 3350 17 G/17G
17 POWDER, FOR SOLUTION ORAL 2 TIMES DAILY
Status: DISCONTINUED | OUTPATIENT
Start: 2019-03-29 | End: 2019-03-30 | Stop reason: HOSPADM

## 2019-03-29 RX ADMIN — GABAPENTIN 600 MG: 300 CAPSULE ORAL at 17:25

## 2019-03-29 RX ADMIN — SODIUM CHLORIDE 10 ML: 9 INJECTION, SOLUTION INTRAMUSCULAR; INTRAVENOUS; SUBCUTANEOUS at 00:02

## 2019-03-29 RX ADMIN — IBUPROFEN 800 MG: 400 TABLET ORAL at 06:31

## 2019-03-29 RX ADMIN — POLYETHYLENE GLYCOL 3350 17 G: 17 POWDER, FOR SOLUTION ORAL at 17:25

## 2019-03-29 RX ADMIN — Medication 10 ML: at 06:31

## 2019-03-29 RX ADMIN — SODIUM CHLORIDE 75 ML/HR: 900 INJECTION, SOLUTION INTRAVENOUS at 03:55

## 2019-03-29 RX ADMIN — IBUPROFEN 800 MG: 400 TABLET ORAL at 21:32

## 2019-03-29 RX ADMIN — HYDROCODONE BITARTRATE AND ACETAMINOPHEN 1 TABLET: 7.5; 325 TABLET ORAL at 06:31

## 2019-03-29 RX ADMIN — HYDROCODONE BITARTRATE AND ACETAMINOPHEN 1 TABLET: 7.5; 325 TABLET ORAL at 14:10

## 2019-03-29 RX ADMIN — POLYETHYLENE GLYCOL 3350 17 G: 17 POWDER, FOR SOLUTION ORAL at 09:13

## 2019-03-29 RX ADMIN — HYDROCODONE BITARTRATE AND ACETAMINOPHEN 1 TABLET: 7.5; 325 TABLET ORAL at 21:32

## 2019-03-29 RX ADMIN — IBUPROFEN 800 MG: 400 TABLET ORAL at 14:10

## 2019-03-29 RX ADMIN — Medication 10 ML: at 06:33

## 2019-03-29 RX ADMIN — Medication 5 ML: at 21:32

## 2019-03-29 RX ADMIN — GABAPENTIN 600 MG: 300 CAPSULE ORAL at 08:33

## 2019-03-29 RX ADMIN — PROMETHAZINE HYDROCHLORIDE 25 MG: 25 INJECTION INTRAMUSCULAR; INTRAVENOUS at 00:02

## 2019-03-29 NOTE — PROGRESS NOTES
. 
[facility] Progress Note Date: 3/29/2019 GI Problem: Abnormal CT History of Present Illness:  Patient is a 34 y.o. female who is seen in consultation for constipation. CT showed jejunal thickening, but push enteroscopy was negative. Surgeon found mass on rectal exam, but flex sig just removed small polyp. She is doing fine today. Hospital Medications: 
Current Facility-Administered Medications Medication Dose Route Frequency  polyethylene glycol (MIRALAX) packet 17 g  17 g Oral BID  promethazine (PHENERGAN) with saline injection 25 mg  25 mg IntraVENous Q6H PRN  
 lactated Ringers infusion  100 mL/hr IntraVENous CONTINUOUS  
 lactated Ringers infusion  100 mL/hr IntraVENous CONTINUOUS  
 sodium chloride (NS) flush 5-40 mL  5-40 mL IntraVENous Q8H  
 sodium chloride (NS) flush 5-40 mL  5-40 mL IntraVENous PRN  
 LORazepam (ATIVAN) tablet 0.5 mg  0.5 mg Oral Q6H PRN  
 sodium chloride (NS) flush 5-40 mL  5-40 mL IntraVENous Q8H  
 sodium chloride (NS) flush 5-40 mL  5-40 mL IntraVENous PRN  
 ondansetron (ZOFRAN) injection 4 mg  4 mg IntraVENous Q4H PRN  
 gabapentin (NEURONTIN) capsule 600 mg  600 mg Oral TID  
 HYDROcodone-acetaminophen (NORCO) 7.5-325 mg per tablet 1 Tab  1 Tab Oral Q8H  
 ibuprofen (MOTRIN) tablet 800 mg  800 mg Oral Q8H  
 linaclotide (LINZESS) capsule 290 mcg (Patient Supplied)  290 mcg Oral DAILY Objective:  
 
Physical Exam: 
Vitals: 
Visit Vitals BP 91/54 Pulse 77 Temp 98.3 °F (36.8 °C) Resp 16 SpO2 96% General: No acute distress. Skin:  Extremities and face reveal no rashes. No daniel erythema. No telangiectasias on the chest wall. HEENT: Sclerae anicteric. No oral ulcers. No abnormal pigmentation of the lips. The neck is supple. Cardiovascular: Regular rate and rhythm. No murmurs, gallops, or rubs. Respiratory:  Comfortable breathing  With no accessory muscle use.  Clear breath sounds with no wheezes, rales, or rhonchi. GI:  Abdomen nondistended, soft, and nontender. Normal active bowel sounds. No enlargement of the liver or spleen. No masses palpable. Musculoskeletal:  No pitting edema of the lower legs. Extremities have good range of motion. Neurological:  Gross memory appears intact. Patient is alert and oriented. Psychiatric:  Mood appears appropriate with judgement intact. Lymphatic:  No cervical or supraclavicular adenopathy. Laboratory:   
Recent Labs  
  03/27/19 
1415 WBC 9.6  
RBC 4.04* HGB 12.6 HCT 38.0  
 Recent Labs  
  03/27/19 
1415 GLU 87   
K 4.0  
* CO2 22 BUN 10  
CREA 0.47* CA 8.5 No results for input(s): PTP, INR, APTT in the last 72 hours. No lab exists for component: INREXT No results for input(s): TBIL, CBIL, SGOT, GPT, AP, ALB, TP, AML, LPSE in the last 72 hours. Assessment: A 34 y.o. female with CT showing jejunal thickening but push enteroscopy negative and surgeon finding mass on rectal exam but flex sig just removed small polyp. Plan: Will add Miralax bid (and send home on) Continue Linzess Will sign off- please call back if further issues Will see in office as outpt Signed By: Zuleima Dickson MD   
 March 29, 2019

## 2019-03-29 NOTE — PROGRESS NOTES
H&P/Consult Note/Progress Note/Office Note:  
Anitha Mcclellan  MRN: [id number]  JZ:[birth year]  Age:38 y.o. 
 
HPI: Anitha Mcclellan is a 34 y.o. female who presents with complaints of constipation. She has a history of partial quadriplegia and is on a bowel regimen at home. She has tried several different laxatives, enemas, and Magic bullets without any relief. The patient reports nausea and vomiting as well as crampy abdominal pain. She was seen in the ER on 3/22. GI was consulted and they recommended a continued bowel regimen. She had a CT scan on 3/26 which showed mild degree of small bowel wall thickening of the proximal small bowel just distal to ligament of Treitz with no focal infiltrate changes surrounding mesentery; correlate for any focal enteritis. Her labs are unremarkable. AF, VSS. Last colonoscopy was in 2014. 
 
3/28/19: Awake in bed. Reports small amount of soft BM last night. Plans for flex sig and push enteroscopy with GI today. 3/29/19: Awake in bed. No complaints. +flatus/+BM x2 yesterday. Tolerating Regular diet. No nausea or vomiting. AF, NAD. Past Medical History:  
Diagnosis Date  Abdominal pain  ADD (attention deficit disorder)  Anxiety  Back pain  Chronic cystitis 4/3/2014  Closed fracture of left fibula and tibia  Depression  Edema  Flank pain  Fusion of spine, cervical region  GERD (gastroesophageal reflux disease)  Gross hematuria  Hand deformities, acquired  Hand pain  Herpes simplex  Incontinence of urine in female  Left ankle pain  Neurogenic bladder 4/3/2014  Neurogenic bladder  Neurogenic bladder, NOS   
 Neuropathy  Obesity  Other chronic cystitis  Paraplegia ([address]  Quadriplegia ([address]  Urinary tract infection, site not specified Past Surgical History:  
Procedure Laterality Date 2021 Lesley Pink N/A 3/28/2019 SIGMOIDOSCOPY FLEXIBLE performed by Stanley Hickman MD at [facility]  
 spinal fusion  HX ORTHOPAEDIC    
 HX OTHER SURGICAL    
 implanted stimulator with 8 electrodes Current Facility-Administered Medications Medication Dose Route Frequency  polyethylene glycol (MIRALAX) packet 17 g  17 g Oral BID  promethazine (PHENERGAN) with saline injection 12.5 mg  12.5 mg IntraVENous Q6H PRN  
 sodium chloride (NS) flush 5-40 mL  5-40 mL IntraVENous Q8H  
 sodium chloride (NS) flush 5-40 mL  5-40 mL IntraVENous PRN  
 LORazepam (ATIVAN) tablet 0.5 mg  0.5 mg Oral Q6H PRN  
 sodium chloride (NS) flush 5-40 mL  5-40 mL IntraVENous Q8H  
 sodium chloride (NS) flush 5-40 mL  5-40 mL IntraVENous PRN  
 ondansetron (ZOFRAN) injection 4 mg  4 mg IntraVENous Q4H PRN  
 gabapentin (NEURONTIN) capsule 600 mg  600 mg Oral TID  
 HYDROcodone-acetaminophen (NORCO) 7.5-325 mg per tablet 1 Tab  1 Tab Oral Q8H  
 ibuprofen (MOTRIN) tablet 800 mg  800 mg Oral Q8H  
 linaclotide (LINZESS) capsule 290 mcg (Patient Supplied)  290 mcg Oral DAILY Betadine [povidone-iodine] and Pcn [penicillins] Social History Socioeconomic History  Marital status: SINGLE Spouse name: Not on file  Number of children: Not on file  Years of education: Not on file  Highest education level: Not on file Tobacco Use  Smoking status: Never Smoker  Smokeless tobacco: Never Used Substance and Sexual Activity  Alcohol use: Yes Comment: weekends  Drug use: No  
 Sexual activity: Yes  
  Partners: Male Birth control/protection: None Other Topics Concern [address] [facility] Yes Social History Narrative ** Merged History Encounter ** Denies physical or sexual abuse Social History Tobacco Use Smoking Status Never Smoker Smokeless Tobacco Never Used Family History Problem Relation Age of Onset  Heart Disease Mother  Hypertension Mother  Coronary Artery Disease Mother  Cancer Other   
     gen fam HX  Diabetes Other   
     gen fam HX  
 High Cholesterol Other   
     gen fam HX  Breast Cancer Maternal Aunt  Coronary Artery Disease Maternal Uncle  Heart Disease Maternal Grandmother  Coronary Artery Disease Maternal Grandmother  Heart Disease Maternal Grandfather  Coronary Artery Disease Maternal Grandfather ROS: The patient has no difficulty with chest pain or shortness of breath. No fever or chills. Comprehensive review of systems was otherwise unremarkable except as noted above. Physical Exam:  
Visit Vitals /62 Pulse 76 Temp 98.4 °F (36.9 °C) Resp 14 SpO2 97% Constitutional: Alert, oriented, cooperative patient in no acute distress; appears stated age Eyes:Sclera are clear. EOMs intact ENMT: no external lesions gross hearing normal; no obvious neck masses, no ear or lip lesions, nares normal 
CV: RRR. Normal perfusion Resp: No JVD. Breathing is  non-labored; no audible wheezing. GI: soft and non-distended, non-tender Musculoskeletal: unremarkable with normal function. No embolic signs or cyanosis. Neuro:  Oriented; moves all 4; no focal deficits Psychiatric: normal affect and mood, no memory impairment Recent vitals (if inpt): 
Patient Vitals for the past 24 hrs: 
 BP Temp Pulse Resp SpO2  
03/29/19 1115 110/62 98.4 °F (36.9 °C) 76 14 97 % 03/29/19 1043     97 % 03/29/19 0712 91/54 98.3 °F (36.8 °C) 77 16 96 % 03/29/19 0318 (!) 89/55 98.3 °F (36.8 °C) 75 18 94 % 03/28/19 2333 107/68 98.3 °F (36.8 °C) 71 17 97 % 03/28/19 1931 (!) 87/54 98 °F (36.7 °C) 84 16 97 % 03/28/19 1629 133/78 97.8 °F (36.6 °C) (!) 108 17 98 % 03/28/19 1555 123/56  74 15 98 % 03/28/19 1541 134/53  68 16 97 % 03/28/19 1531 134/53  76 15 96 % 03/28/19 1522 102/53 97.8 °F (36.6 °C) 63 16 94 % 03/28/19 1521 102/53  65 15 93 % 03/28/19 1451 95/50  72 18 100 % 03/28/19 1338 91/58  74 16 96 % Labs: 
Recent Labs  
  03/27/19 
1415 WBC 9.6 HGB 12.6    
K 4.0  
* CO2 22 BUN 10  
CREA 0.47* GLU 87 Lab Results Component Value Date/Time WBC 9.6 03/27/2019 02:15 PM  
 HGB 12.6 03/27/2019 02:15 PM  
 PLATELET 569 36/00/4570 02:15 PM  
 Sodium 141 03/27/2019 02:15 PM  
 Potassium 4.0 03/27/2019 02:15 PM  
 Chloride 113 (H) 03/27/2019 02:15 PM  
 CO2 22 03/27/2019 02:15 PM  
 BUN 10 03/27/2019 02:15 PM  
 Creatinine 0.47 (L) 03/27/2019 02:15 PM  
 Glucose 87 03/27/2019 02:15 PM  
 Bilirubin, total 0.3 12/01/2016 09:03 AM  
 Bilirubin, direct 0.09 12/01/2016 09:03 AM  
 AST (SGOT) 13 12/01/2016 09:03 AM  
 ALT (SGPT) 13 12/01/2016 09:03 AM  
 Alk. phosphatase 67 12/01/2016 09:03 AM  
 
 
I reviewed recent labs and recent radiologic studies. CT Results (most recent): 
Results from [facility] Encounter encounter on 03/26/19 CT ENTEROGRAPHY W CONT Narrative INDICATION:  66-year-old female with generalized abdominal pain and nausea. COMPARISON EXAMS: CT abdomen pelvis dated 4/10/2017 PROCEDURE: Axial imaging from chest base through the pelvis. There is no oral 
contrast. There is 100mL Isovue-370 given intravenously. FINDINGS: 
 
CHEST BASE: Lung bases are clear of any focal infiltrate. Stable appearance to a 
small bilateral pleural effusions. Scattered areas of fibrosis identified in the 
bilateral lung bases. No focus of consolidation. LIVER, PANCREAS, SPLEEN: Unremarkable GALLBLADDER: 
The gallbladder is slightly contracted time examination. No stone or sludge 
formation. No biliary ductal dilatation BOWEL AND MESENTERY: No free air or free fluid. Bowel is normal in caliber. No 
mesenteric adenopathy The appendix is unremarkable.  
Mild degree of thickening of the proximal small bowel distal to ligament of 
 Treitz. No focal inflammatory changes in the surrounding mesentery. Mild degree 
of focal enteritis is suggested. Correlate these findings with patient's 
clinical history AORTA AND RETROPERITONEUM: Normal course and caliber of aorta. No 
retroperitoneal adenopathy KIDNEYS AND ADRENAL GLANDS: Bilateral adrenal glands unremarkable. No 
hydronephrosis or nephrolithiasis. PELVIS 
 
: Bladder contour is unremarkable. Fluid identified within the endometrium. Correlate to patient's history of any recent menstrual cycles. BOWEL AND MESENTERY: No pelvic free air or free fluid. Bowel is normal in 
caliber. No pelvic or inguinal adenopathy MSK AND SOFT TISSUES: Normal vertebral body height and alignment Impression IMPRESSION: 
Mild degree of small bowel wall thickening of the proximal small bowel just 
distal to ligament of Treitz with no focal infiltrate changes surrounding 
mesentery. Correlate for any focal enteritis. Remainder of exam is unremarkable I independently reviewed radiology images for studies I described above or studies I have ordered. Admission date (for inpatients): 3/27/2019 * No surgery found *  Procedure(s): ESOPHAGOGASTRODUODENOSCOPY (EGD) WITH PUSH/ 25/ 201 SIGMOIDOSCOPY FLEXIBLE 
ESOPHAGOGASTRODUODENAL (EGD) BIOPSY ENDOSCOPIC POLYPECTOMY ASSESSMENT/PLAN: 
Problem List  Date Reviewed: 12/12/2018 Codes Class Noted * (Principal) Abdominal pain ICD-10-CM: R10.9 ICD-9-CM: 789.00  3/27/2019 Carpal tunnel syndrome on both sides ICD-10-CM: G56.03 
ICD-9-CM: 354.0  9/18/2018 Pain in both wrists ICD-10-CM: M25.531, M25.532 ICD-9-CM: 719.43  9/18/2018 Quadriplegia ([facility] [address] ICD-10-CM: G82.50 ICD-9-CM: 344.00  Unknown Overview Addendum 9/14/2016  3:00 PM by Sonia Jorge Post-traumatic Back pain ICD-10-CM: M54.9 ICD-9-CM: 724.5  Unknown ADD (attention deficit disorder) ICD-10-CM: F98.8 ICD-9-CM: 314.00  Unknown Depression ICD-10-CM: F32.9 ICD-9-CM: 311  Unknown Anxiety ICD-10-CM: F41.9 ICD-9-CM: 300.00  Unknown GERD (gastroesophageal reflux disease) ICD-10-CM: K21.9 ICD-9-CM: 530.81  Unknown Paraplegia ([facility] [address] ICD-10-CM: G82.20 ICD-9-CM: 344.1  Unknown Neurogenic bladder ICD-10-CM: N31.9 ICD-9-CM: 596.54  4/3/2014 Chronic cystitis ICD-10-CM: N30.20 ICD-9-CM: 595.2  4/3/2014 Principal Problem: 
  Abdominal pain (3/27/2019) Continue present care Regular diet GI following Hopefully home in am 
 
Signed:  Jade Altamirano NP

## 2019-03-29 NOTE — PROGRESS NOTES
Spoke to Ms. Dillon in room 201 about Case Management and discharge planning. Ms. Graciela Chiadez lives with her parents in [place] [facility] [place], but also stays on her boat at [facility] on [facility]. She is paraplegic, but completley independent with ADLs, including driving, with use of a manual wheelchair. No additional discharge needs identified. Care Management Interventions PCP Verified by CM: Yes Current Support Network: Relative's Home Confirm Follow Up Transport: Self Plan discussed with Pt/Family/Caregiver:  Yes

## 2019-03-29 NOTE — PROGRESS NOTES
END OF SHIFT NOTE: 
 
INTAKE/OUTPUT 
03/28 0701 - 03/29 0700 In: 440 [P.O.:240; I.V.:200] Out: 680 [Urine:670] Voiding: YES Catheter: NO 
Drain:   
 
 
 
 
 
Flatus: Patient does have flatus present. Stool:  1 occurrences. Characteristics: 
Stool Assessment Stool Color: Yellow Stool Appearance: Loose Stool Amount: Small Stool Source/Status: Rectum Emesis: 0 occurrences. Characteristics: VITAL SIGNS Patient Vitals for the past 12 hrs: 
 Temp Pulse Resp BP SpO2  
03/29/19 0318 98.3 °F (36.8 °C) 75 18 (!) 89/55 94 % 03/28/19 2333 98.3 °F (36.8 °C) 71 17 107/68 97 % 03/28/19 1931 98 °F (36.7 °C) 84 16 (!) 87/54 97 % Pain Assessment Pain Intensity 1: 0 (03/29/19 0318) Pain Location 1: Abdomen Pain Intervention(s) 1: Medication (see MAR) Patient Stated Pain Goal: 0 Ambulating Yes Shift report given to oncoming nurse at the bedside.  
 
Petar Urbina RN

## 2019-03-30 VITALS
HEART RATE: 93 BPM | TEMPERATURE: 98.2 F | RESPIRATION RATE: 17 BRPM | DIASTOLIC BLOOD PRESSURE: 81 MMHG | OXYGEN SATURATION: 95 % | SYSTOLIC BLOOD PRESSURE: 122 MMHG

## 2019-03-30 PROCEDURE — 74011250637 HC RX REV CODE- 250/637: Performed by: INTERNAL MEDICINE

## 2019-03-30 PROCEDURE — 74011250637 HC RX REV CODE- 250/637: Performed by: NURSE PRACTITIONER

## 2019-03-30 PROCEDURE — 96376 TX/PRO/DX INJ SAME DRUG ADON: CPT

## 2019-03-30 PROCEDURE — 99218 HC RM OBSERVATION: CPT

## 2019-03-30 PROCEDURE — 74011000250 HC RX REV CODE- 250: Performed by: SURGERY

## 2019-03-30 PROCEDURE — 74011250636 HC RX REV CODE- 250/636: Performed by: SURGERY

## 2019-03-30 RX ORDER — POLYETHYLENE GLYCOL 3350 17 G/17G
17 POWDER, FOR SOLUTION ORAL 2 TIMES DAILY
Qty: 60 PACKET | Refills: 0 | Status: SHIPPED | OUTPATIENT
Start: 2019-03-30

## 2019-03-30 RX ADMIN — GABAPENTIN 600 MG: 300 CAPSULE ORAL at 08:08

## 2019-03-30 RX ADMIN — POLYETHYLENE GLYCOL 3350 17 G: 17 POWDER, FOR SOLUTION ORAL at 08:09

## 2019-03-30 RX ADMIN — PROMETHAZINE HYDROCHLORIDE 12.5 MG: 25 INJECTION INTRAMUSCULAR; INTRAVENOUS at 00:35

## 2019-03-30 NOTE — DISCHARGE SUMMARY
Physician Discharge Summary     Patient ID:  Ronny Eduardo  [id number]  26 y.o.  1980    Allergies: Betadine [povidone-iodine] and Pcn [penicillins]    HPI: Ronny Eduardo is a 34 y.o. female who presents with complaints of constipation. She has a history of partial quadriplegia and is on a bowel regimen at home. She has tried several different laxatives, enemas, and Magic bullets without any relief. The patient reports nausea and vomiting as well as crampy abdominal pain.      She was seen in the ER on 3/22. GI was consulted and they recommended a continued bowel regimen. She had a CT scan on 3/26 which showed mild degree of small bowel wall thickening of the proximal small bowel just distal to ligament of Treitz with no focal infiltrate changes surrounding mesentery; correlate for any focal enteritis.     Her labs are unremarkable. AF, VSS. Last colonoscopy was in 2014. Pt underwent Push Enteroscopy and Flex Sigmoidoscopy by GI 3/28/19. GI recommends Full colonoscopy as outpt with 2 day prep in future. Admit Date: 3/27/2019    Discharge Date: 3/30/2019    * Admission Diagnoses: Impaction of colon [facility]) [K56.49]    * Discharge Diagnoses:    Hospital Problems as of 3/30/2019 Date Reviewed: 12/12/2018          Codes Class Noted - Resolved POA    * (Principal) Abdominal pain ICD-10-CM: R10.9  ICD-9-CM: 789.00  3/27/2019 - Present Unknown               Admission Condition: Fair    * Discharge Condition: good    * Procedures: Procedure(s):  ESOPHAGOGASTRODUODENOSCOPY (EGD) WITH PUSH/ 25/ 201  SIGMOIDOSCOPY FLEXIBLE  ESOPHAGOGASTRODUODENAL (EGD) BIOPSY  ENDOSCOPIC POLYPECTOMY    * Hospital Course:   Normal hospital course for this procedure.     Consults: Gastroenterology    Significant Diagnostic Studies: labs and radiology    * Disposition: Home    Discharge Medications:   Current Discharge Medication List      START taking these medications    Details   polyethylene glycol (MIRALAX) 17 gram packet Take 1 Packet by mouth two (2) times a day. Qty: 61 Packet, Refills: 0         CONTINUE these medications which have NOT CHANGED    Details   linaclotide (LINZESS) 290 mcg cap capsule Take  by mouth Daily (before breakfast). HYDROcodone-acetaminophen (NORCO) 7.5-325 mg per tablet Take 1 Tab by mouth every eight (8) hours. Max Daily Amount: 3 Tabs. Qty: 90 Tab, Refills: 0    Associated Diagnoses: Chronic thoracic back pain, unspecified back pain laterality      gabapentin (NEURONTIN) 600 mg tablet Take 1 Tab by mouth three (3) times daily. Qty: 270 Tab, Refills: 3      acyclovir (ZOVIRAX) 400 mg tablet Take 1 Tab by mouth three (3) times daily. For 5 days  Qty: 15 Tab, Refills: 11      ciprofloxacin HCl (CIPRO) 500 mg tablet Take 1 Tab by mouth two (2) times a day. Qty: 28 Tab, Refills: 0    Associated Diagnoses: Acute cystitis without hematuria      naloxegol (MOVANTIK) 25 mg tab tablet Take 1 Tab by mouth Daily (before breakfast). Qty: 30 Tab, Refills: 11    Associated Diagnoses: Therapeutic opioid-induced constipation (OIC)      traMADol (ULTRAM) 50 mg tablet Take 1 Tab by mouth every six (6) hours as needed for Pain. Max Daily Amount: 200 mg. Qty: 60 Tab, Refills: 3    Associated Diagnoses: Chronic thoracic back pain, unspecified back pain laterality      ibuprofen (MOTRIN) 800 mg tablet Take 1 Tab by mouth every eight (8) hours. Qty: 90 Tab, Refills: 2    Associated Diagnoses: Chronic thoracic back pain, unspecified back pain laterality             * Follow-up Care/Patient Instructions:   Activity: Activity as tolerated  Diet: Regular Diet  Wound Care: None needed    Follow-up Information     Follow up With Specialties Details Why Contact Info    Makayla Castorena MD Gastroenterology On 4/23/2019 1:00    [facility]   [address]  [phone]      Shaila Martinez MD General Surgery  CALL OFFICE TO SCHEDULE AN [address] [facility]  [phone]      Ivett Bergeron MD [facility] [address] [place] [address]  [phone]          Discharge Instructions/Follow-up Plans:   MD Instructions:     Follow-up with Dr. Fernando Peters if needed  Follow up with GI 4/23/19    Diet - as tolerated - Regular diet.     Resume other home medications.    Miralax BID, Continue Linzess     If problems or questions arise, please call our office at [phone].     Greater than 30 minutes were spent discharging the patient      Signed:  Cris Rosales NP  3/30/2019  11:54 AM

## 2019-03-30 NOTE — DISCHARGE INSTRUCTIONS
DISCHARGE SUMMARY from Nurse    PATIENT INSTRUCTIONS:    After general anesthesia or intravenous sedation, for 24 hours or while taking prescription Narcotics:  · Limit your activities  · Do not drive and operate hazardous machinery  · Do not make important personal or business decisions  · Do  not drink alcoholic beverages  · If you have not urinated within 8 hours after discharge, please contact your surgeon on call. Report the following to your surgeon:  · Excessive pain, swelling, redness or odor of or around the surgical area  · Temperature over 100.5  · Nausea and vomiting lasting longer than 4 hours or if unable to take medications  · Any signs of decreased circulation or nerve impairment to extremity: change in color, persistent  numbness, tingling, coldness or increase pain  · Any questions    What to do at Home:  Recommended activity: No lifting, Driving, or Strenuous exercise for next 2-3 weeks. If you experience any of the following symptoms fever of 101.3 or greater, abdominal pain, uncontrollable nausea or vomitting, please follow up with Dr. Evie Ponce. *  Please give a list of your current medications to your Primary Care Provider. *  Please update this list whenever your medications are discontinued, doses are      changed, or new medications (including over-the-counter products) are added. *  Please carry medication information at all times in case of emergency situations. These are general instructions for a healthy lifestyle:    No smoking/ No tobacco products/ Avoid exposure to second hand smoke  Surgeon General's Warning:  Quitting smoking now greatly reduces serious risk to your health.     Obesity, smoking, and sedentary lifestyle greatly increases your risk for illness    A healthy diet, regular physical exercise & weight monitoring are important for maintaining a healthy lifestyle    You may be retaining fluid if you have a history of heart failure or if you experience any of the following symptoms:  Weight gain of 3 pounds or more overnight or 5 pounds in a week, increased swelling in our hands or feet or shortness of breath while lying flat in bed. Please call your doctor as soon as you notice any of these symptoms; do not wait until your next office visit. Recognize signs and symptoms of STROKE:    F-face looks uneven    A-arms unable to move or move unevenly    S-speech slurred or non-existent    T-time-call 911 as soon as signs and symptoms begin-DO NOT go       Back to bed or wait to see if you get better-TIME IS BRAIN. Warning Signs of HEART ATTACK     Call 911 if you have these symptoms:   Chest discomfort. Most heart attacks involve discomfort in the center of the chest that lasts more than a few minutes, or that goes away and comes back. It can feel like uncomfortable pressure, squeezing, fullness, or pain.  Discomfort in other areas of the upper body. Symptoms can include pain or discomfort in one or both arms, the back, neck, jaw, or stomach.  Shortness of breath with or without chest discomfort.  Other signs may include breaking out in a cold sweat, nausea, or lightheadedness. Don't wait more than five minutes to call 911 - MINUTES MATTER! Fast action can save your life. Calling 911 is almost always the fastest way to get lifesaving treatment. Emergency Medical Services staff can begin treatment when they arrive -- up to an hour sooner than if someone gets to the hospital by car. The discharge information has been reviewed with the patient. The patient verbalized understanding. Discharge medications reviewed with the patient and appropriate educational materials and side effects teaching were provided.   ___________________________________________________________________________________________________________________________________    Patient Education        Bowel Blockage (Intestinal Obstruction): Care Instructions  Your Care Instructions  A bowel blockage, also called an intestinal obstruction, can prevent gas, fluids, or solids from moving through the intestines normally. It can cause constipation and, rarely, diarrhea. You may have pain, nausea, vomiting, and cramping. Most of the time, complete blockages require a stay in the hospital and possibly surgery. But if your bowel is only partly blocked, your doctor may tell you to wait until it clears on its own and you are able to pass gas and stool. If so, there are things you can do at home to help make you feel better. If you have had surgery for a bowel blockage, there are things you can do at home to make sure you heal well. You can also make some changes to keep your bowel from becoming blocked again. Follow-up care is a key part of your treatment and safety. Be sure to make and go to all appointments, and call your doctor if you are having problems. It's also a good idea to know your test results and keep a list of the medicines you take. How can you care for yourself at home? If your doctor has told you to wait at home for a blockage to clear on its own:  · Follow your doctor's instructions. These may include eating a liquid diet to avoid complete blockage. · Be safe with medicines. Take your medicines exactly as prescribed. Call your doctor if you think you are having a problem with your medicine. · Put a heating pad set on low on your belly to relieve mild cramps and pain. To prevent another blockage  · Try to eat smaller amounts of food more often. For example, have 5 or 6 small meals throughout the day instead of 2 or 3 large meals. · Chew your food very well. Try to chew each bite about 20 times or until it is liquid. · Avoid high-fiber foods and raw fruits and vegetables with skins, husks, strings, or seeds. These can form a ball of undigested material that can cause a blockage if a part of your bowel is scarred or narrowed.   · Check with your doctor before you eat whole-grain products or use a fiber supplement such as Citrucel or Metamucil. · To help you have regular bowel movements, eat at regular times, do not strain during a bowel movement, and drink at least 8 to 10 glasses of water each day. If you have kidney, heart, or liver disease and have to limit fluids, talk with your doctor or before you increase the amount of fluids you drink. · Drink high-calorie liquid formulas if your doctor says to. Severe symptoms may make it hard for your body to take in vitamins and minerals. · Get regular exercise. It helps you digest your food better. Get at least 30 minutes of physical activity on most days of the week. Walking is a good choice. When should you call for help? Call your doctor now or seek immediate medical care if:    · You have a fever.     · You are vomiting.     · You have new or worse belly pain.     · You cannot pass stools or gas.    Watch closely for changes in your health, and be sure to contact your doctor if you have any problems. Where can you learn more? Go to http://ania-deana.info/. Enter K271 in the search box to learn more about \"Bowel Blockage (Intestinal Obstruction): Care Instructions. \"  Current as of: March 27, 2018  Content Version: 11.9  © 3704-4629 [employer]. Care instructions adapted under license by [employer] (which disclaims liability or warranty for this information). If you have questions about a medical condition or this instruction, always ask your healthcare professional. [place] [address] any warranty or liability for your use of this information.

## 2019-03-30 NOTE — PROGRESS NOTES
H&P/Consult Note/Progress Note/Office Note:  
Ronn Prieto  MRN: [id number]  [facility]:[birth year]  Age:38 y.o. 
 
HPI: Ronn Prieto is a 34 y.o. female who presents with complaints of constipation. She has a history of partial quadriplegia and is on a bowel regimen at home. She has tried several different laxatives, enemas, and Magic bullets without any relief. The patient reports nausea and vomiting as well as crampy abdominal pain. She was seen in the ER on 3/22. GI was consulted and they recommended a continued bowel regimen. She had a CT scan on 3/26 which showed mild degree of small bowel wall thickening of the proximal small bowel just distal to ligament of Treitz with no focal infiltrate changes surrounding mesentery; correlate for any focal enteritis. Her labs are unremarkable. AF, VSS. Last colonoscopy was in 2014. 
 
3/28/19: Awake in bed. Reports small amount of soft BM last night. Plans for flex sig and push enteroscopy with GI today. 3/29/19: Awake in bed. No complaints. +flatus/+BM x2 yesterday. Tolerating Regular diet. No nausea or vomiting. AF, NAD.  
 
3/30/19: Awake in bed. No complaints. Feeling good and wants to go home. +flatus/+BM multiple. Tolerating Regular diet. No nausea or vomiting. AF, NAD. Past Medical History:  
Diagnosis Date  Abdominal pain  ADD (attention deficit disorder)  Anxiety  Back pain  Chronic cystitis 4/3/2014  Closed fracture of left fibula and tibia  Depression  Edema  Flank pain  Fusion of spine, cervical region  GERD (gastroesophageal reflux disease)  Gross hematuria  Hand deformities, acquired  Hand pain  Herpes simplex  Incontinence of urine in female  Left ankle pain  Neurogenic bladder 4/3/2014  Neurogenic bladder  Neurogenic bladder, NOS   
 Neuropathy  Obesity  Other chronic cystitis  Paraplegia ([address]  Quadriplegia ([address]  Urinary tract infection, site not specified Past Surgical History:  
Procedure Laterality Date 2021 Lesley Pink N/A 3/28/2019 SIGMOIDOSCOPY FLEXIBLE performed by Jade Dinero MD at [facility]  
 spinal fusion  HX ORTHOPAEDIC    
 HX OTHER SURGICAL    
 implanted stimulator with 8 electrodes Current Facility-Administered Medications Medication Dose Route Frequency  polyethylene glycol (MIRALAX) packet 17 g  17 g Oral BID  promethazine (PHENERGAN) with saline injection 12.5 mg  12.5 mg IntraVENous Q6H PRN  
 sodium chloride (NS) flush 5-40 mL  5-40 mL IntraVENous Q8H  
 sodium chloride (NS) flush 5-40 mL  5-40 mL IntraVENous PRN  
 LORazepam (ATIVAN) tablet 0.5 mg  0.5 mg Oral Q6H PRN  
 sodium chloride (NS) flush 5-40 mL  5-40 mL IntraVENous PRN  
 ondansetron (ZOFRAN) injection 4 mg  4 mg IntraVENous Q4H PRN  
 gabapentin (NEURONTIN) capsule 600 mg  600 mg Oral TID  
 HYDROcodone-acetaminophen (NORCO) 7.5-325 mg per tablet 1 Tab  1 Tab Oral Q8H  
 ibuprofen (MOTRIN) tablet 800 mg  800 mg Oral Q8H  
 linaclotide (LINZESS) capsule 290 mcg (Patient Supplied)  290 mcg Oral DAILY Betadine [povidone-iodine] and Pcn [penicillins] Social History Socioeconomic History  Marital status: SINGLE Spouse name: Not on file  Number of children: Not on file  Years of education: Not on file  Highest education level: Not on file Tobacco Use  Smoking status: Never Smoker  Smokeless tobacco: Never Used Substance and Sexual Activity  Alcohol use: Yes Comment: weekends  Drug use: No  
 Sexual activity: Yes  
  Partners: Male Birth control/protection: None Other Topics Concern [address] [facility] Yes Social History Narrative ** Merged History Encounter ** Denies physical or sexual abuse Social History Tobacco Use Smoking Status Never Smoker Smokeless Tobacco Never Used Family History Problem Relation Age of Onset  Heart Disease Mother  Hypertension Mother  Coronary Artery Disease Mother  Cancer Other   
     gen fam HX  Diabetes Other   
     gen fam HX  
 High Cholesterol Other   
     gen fam HX  Breast Cancer Maternal Aunt  Coronary Artery Disease Maternal Uncle  Heart Disease Maternal Grandmother  Coronary Artery Disease Maternal Grandmother  Heart Disease Maternal Grandfather  Coronary Artery Disease Maternal Grandfather ROS: The patient has no difficulty with chest pain or shortness of breath. No fever or chills. Comprehensive review of systems was otherwise unremarkable except as noted above. Physical Exam:  
Visit Vitals BP (!) 147/101 Pulse 93 Temp 98.2 °F (36.8 °C) Resp 17 SpO2 95% Constitutional: Alert, oriented, cooperative patient in no acute distress; appears stated age Eyes:Sclera are clear. EOMs intact ENMT: no external lesions gross hearing normal; no obvious neck masses, no ear or lip lesions, nares normal 
CV: RRR. Normal perfusion Resp: No JVD. Breathing is  non-labored; no audible wheezing. GI: soft and non-distended, non-tender Musculoskeletal: unremarkable with normal function. No embolic signs or cyanosis. Neuro:  Oriented; moves all 4; no focal deficits Psychiatric: normal affect and mood, no memory impairment Recent vitals (if inpt): 
Patient Vitals for the past 24 hrs: 
 BP Temp Pulse Resp SpO2  
03/30/19 1119 (!) 147/101 98.2 °F (36.8 °C) 93 17 95 % 03/30/19 0719 117/76 98.4 °F (36.9 °C) 62 17 96 % 03/30/19 0314 110/74 98.5 °F (36.9 °C) 62 17 97 % 03/30/19 0003 117/67 98.8 °F (37.1 °C) 78 18 98 % 03/29/19 1930 104/67 98.6 °F (37 °C) 78 17 92 % 03/29/19 1540 95/62 98.7 °F (37.1 °C) 78 16 95 % Labs: 
Recent Labs  
  03/27/19 
1415 WBC 9.6 HGB 12.6    
K 4.0  
* CO2 22 BUN 10  
CREA 0.47* GLU 87 Lab Results Component Value Date/Time WBC 9.6 03/27/2019 02:15 PM  
 HGB 12.6 03/27/2019 02:15 PM  
 PLATELET 790 74/47/1833 02:15 PM  
 Sodium 141 03/27/2019 02:15 PM  
 Potassium 4.0 03/27/2019 02:15 PM  
 Chloride 113 (H) 03/27/2019 02:15 PM  
 CO2 22 03/27/2019 02:15 PM  
 BUN 10 03/27/2019 02:15 PM  
 Creatinine 0.47 (L) 03/27/2019 02:15 PM  
 Glucose 87 03/27/2019 02:15 PM  
 Bilirubin, total 0.3 12/01/2016 09:03 AM  
 Bilirubin, direct 0.09 12/01/2016 09:03 AM  
 AST (SGOT) 13 12/01/2016 09:03 AM  
 ALT (SGPT) 13 12/01/2016 09:03 AM  
 Alk. phosphatase 67 12/01/2016 09:03 AM  
 
 
I reviewed recent labs and recent radiologic studies. CT Results (most recent): 
[place] [facility] [place]19 CT ENTEROGRAPHY W CONT Narrative INDICATION:  20-year-old female with generalized abdominal pain and nausea. COMPARISON EXAMS: CT abdomen pelvis dated 4/10/2017 PROCEDURE: Axial imaging from chest base through the pelvis. There is no oral 
contrast. There is 100mL Isovue-370 given intravenously. FINDINGS: 
 
CHEST BASE: Lung bases are clear of any focal infiltrate. Stable appearance to a 
small bilateral pleural effusions. Scattered areas of fibrosis identified in the 
bilateral lung bases. No focus of consolidation. LIVER, PANCREAS, SPLEEN: Unremarkable GALLBLADDER: 
The gallbladder is slightly contracted time examination. No stone or sludge 
formation. No biliary ductal dilatation BOWEL AND MESENTERY: No free air or free fluid. Bowel is normal in caliber. No 
mesenteric adenopathy The appendix is unremarkable. Mild degree of thickening of the proximal small bowel distal to ligament of 
Treitz. No focal inflammatory changes in the surrounding mesentery. Mild degree 
of focal enteritis is suggested. Correlate these findings with patient's 
clinical history AORTA AND RETROPERITONEUM: Normal course and caliber of aorta. No 
retroperitoneal adenopathy KIDNEYS AND ADRENAL GLANDS: Bilateral adrenal glands unremarkable. No 
hydronephrosis or nephrolithiasis. PELVIS 
 
: Bladder contour is unremarkable. Fluid identified within the endometrium. Correlate to patient's history of any recent menstrual cycles. BOWEL AND MESENTERY: No pelvic free air or free fluid. Bowel is normal in 
caliber. No pelvic or inguinal adenopathy MSK AND SOFT TISSUES: Normal vertebral body height and alignment Impression IMPRESSION: 
Mild degree of small bowel wall thickening of the proximal small bowel just 
distal to ligament of Treitz with no focal infiltrate changes surrounding 
mesentery. Correlate for any focal enteritis. Remainder of exam is unremarkable I independently reviewed radiology images for studies I described above or studies I have ordered. Admission date (for inpatients): 3/27/2019 * No surgery found *  Procedure(s): ESOPHAGOGASTRODUODENOSCOPY (EGD) WITH PUSH/ 25/ 201 SIGMOIDOSCOPY FLEXIBLE 
ESOPHAGOGASTRODUODENAL (EGD) BIOPSY ENDOSCOPIC POLYPECTOMY ASSESSMENT/PLAN: 
Problem List  Date Reviewed: 12/12/2018 Codes Class Noted * (Principal) Abdominal pain ICD-10-CM: R10.9 ICD-9-CM: 789.00  3/27/2019 Carpal tunnel syndrome on both sides ICD-10-CM: G56.03 
ICD-9-CM: 354.0  9/18/2018 Pain in both wrists ICD-10-CM: M25.531, M25.532 ICD-9-CM: 719.43  9/18/2018 Quadriplegia ([facility] ICD-10-CM: G82.50 ICD-9-CM: 344.00  Unknown Overview Addendum 9/14/2016  3:00 PM by Jordan Caceres Post-traumatic Back pain ICD-10-CM: M54.9 ICD-9-CM: 724.5  Unknown ADD (attention deficit disorder) ICD-10-CM: F98.8 ICD-9-CM: 314.00  Unknown Depression ICD-10-CM: F32.9 ICD-9-CM: 311  Unknown Anxiety ICD-10-CM: F41.9 ICD-9-CM: 300.00  Unknown GERD (gastroesophageal reflux disease) ICD-10-CM: K21.9 ICD-9-CM: 530.81  Unknown Paraplegia ([facility] ICD-10-CM: G82.20 ICD-9-CM: 344.1  Unknown Neurogenic bladder ICD-10-CM: N31.9 ICD-9-CM: 596.54  4/3/2014 Chronic cystitis ICD-10-CM: N30.20 ICD-9-CM: 595.2  4/3/2014 Principal Problem: 
  Abdominal pain (3/27/2019) Continue present care Regular diet DC Home today F/u with Dr. Epifanio Stephen if needed. Miralax BID per GI Continue Linzess per GI 
F/u in office with GI Signed:  Sonia Salinas NP

## 2019-04-02 LAB — PROMETHEUS IBD SEROL,XPIB7T: NORMAL

## 2022-03-03 ENCOUNTER — TRANSCRIBE ORDER (OUTPATIENT)
Dept: SCHEDULING | Age: 42
End: 2022-03-03

## 2022-03-03 DIAGNOSIS — R10.10 UPPER ABDOMINAL PAIN: Primary | ICD-10-CM

## 2022-03-03 DIAGNOSIS — R11.0 NAUSEA: ICD-10-CM

## 2022-03-19 PROBLEM — R10.9 ABDOMINAL PAIN: Status: ACTIVE | Noted: 2019-03-27

## 2022-03-19 PROBLEM — M25.531 PAIN IN BOTH WRISTS: Status: ACTIVE | Noted: 2018-09-18

## 2022-03-19 PROBLEM — M25.532 PAIN IN BOTH WRISTS: Status: ACTIVE | Noted: 2018-09-18

## 2022-03-19 PROBLEM — G56.03 CARPAL TUNNEL SYNDROME ON BOTH SIDES: Status: ACTIVE | Noted: 2018-09-18

## 2022-04-19 ENCOUNTER — ANESTHESIA EVENT (OUTPATIENT)
Dept: ENDOSCOPY | Age: 42
End: 2022-04-19
Payer: COMMERCIAL

## 2022-04-19 RX ORDER — OXYCODONE HYDROCHLORIDE 5 MG/1
10 TABLET ORAL
Status: CANCELLED | OUTPATIENT
Start: 2022-04-19 | End: 2022-04-20

## 2022-04-19 RX ORDER — ALBUTEROL SULFATE 0.83 MG/ML
2.5 SOLUTION RESPIRATORY (INHALATION) AS NEEDED
Status: CANCELLED | OUTPATIENT
Start: 2022-04-19

## 2022-04-19 RX ORDER — NALOXONE HYDROCHLORIDE 0.4 MG/ML
0.1 INJECTION, SOLUTION INTRAMUSCULAR; INTRAVENOUS; SUBCUTANEOUS AS NEEDED
Status: CANCELLED | OUTPATIENT
Start: 2022-04-19

## 2022-04-19 RX ORDER — DIPHENHYDRAMINE HYDROCHLORIDE 50 MG/ML
12.5 INJECTION, SOLUTION INTRAMUSCULAR; INTRAVENOUS
Status: CANCELLED | OUTPATIENT
Start: 2022-04-19 | End: 2022-04-20

## 2022-04-19 RX ORDER — SODIUM CHLORIDE, SODIUM LACTATE, POTASSIUM CHLORIDE, CALCIUM CHLORIDE 600; 310; 30; 20 MG/100ML; MG/100ML; MG/100ML; MG/100ML
100 INJECTION, SOLUTION INTRAVENOUS CONTINUOUS
Status: CANCELLED | OUTPATIENT
Start: 2022-04-19

## 2022-04-19 RX ORDER — OXYCODONE HYDROCHLORIDE 5 MG/1
5 TABLET ORAL
Status: CANCELLED | OUTPATIENT
Start: 2022-04-19 | End: 2022-04-20

## 2022-04-19 RX ORDER — HYDROMORPHONE HYDROCHLORIDE 1 MG/ML
0.5 INJECTION, SOLUTION INTRAMUSCULAR; INTRAVENOUS; SUBCUTANEOUS
Status: CANCELLED | OUTPATIENT
Start: 2022-04-19

## 2022-04-19 RX ORDER — ONDANSETRON 2 MG/ML
4 INJECTION INTRAMUSCULAR; INTRAVENOUS ONCE
Status: CANCELLED | OUTPATIENT
Start: 2022-04-19 | End: 2022-04-19

## 2022-04-19 NOTE — PROGRESS NOTES
Arrival time and location confirmed will have someone with her to stay during the procedure and drive her home

## 2022-04-20 ENCOUNTER — HOSPITAL ENCOUNTER (OUTPATIENT)
Age: 42
Setting detail: OUTPATIENT SURGERY
Discharge: HOME | End: 2022-04-20
Attending: INTERNAL MEDICINE | Admitting: INTERNAL MEDICINE
Payer: COMMERCIAL

## 2022-04-20 ENCOUNTER — ANESTHESIA (OUTPATIENT)
Dept: ENDOSCOPY | Age: 42
End: 2022-04-20
Payer: COMMERCIAL

## 2022-04-20 VITALS
TEMPERATURE: 96.8 F | OXYGEN SATURATION: 97 % | RESPIRATION RATE: 16 BRPM | HEART RATE: 61 BPM | SYSTOLIC BLOOD PRESSURE: 114 MMHG | DIASTOLIC BLOOD PRESSURE: 59 MMHG

## 2022-04-20 PROCEDURE — 77030009426 HC FCPS BIOP ENDOSC BSC -B: Performed by: INTERNAL MEDICINE

## 2022-04-20 PROCEDURE — 74011250636 HC RX REV CODE- 250/636: Performed by: REGISTERED NURSE

## 2022-04-20 PROCEDURE — 88312 SPECIAL STAINS GROUP 1: CPT

## 2022-04-20 PROCEDURE — 2709999900 HC NON-CHARGEABLE SUPPLY: Performed by: INTERNAL MEDICINE

## 2022-04-20 PROCEDURE — 74011250636 HC RX REV CODE- 250/636: Performed by: ANESTHESIOLOGY

## 2022-04-20 PROCEDURE — 76040000025: Performed by: INTERNAL MEDICINE

## 2022-04-20 PROCEDURE — 76060000031 HC ANESTHESIA FIRST 0.5 HR: Performed by: INTERNAL MEDICINE

## 2022-04-20 PROCEDURE — 74011000250 HC RX REV CODE- 250: Performed by: REGISTERED NURSE

## 2022-04-20 PROCEDURE — 88305 TISSUE EXAM BY PATHOLOGIST: CPT

## 2022-04-20 RX ORDER — MIDAZOLAM HYDROCHLORIDE 1 MG/ML
2 INJECTION, SOLUTION INTRAMUSCULAR; INTRAVENOUS
Status: DISCONTINUED | OUTPATIENT
Start: 2022-04-20 | End: 2022-04-20 | Stop reason: HOSPADM

## 2022-04-20 RX ORDER — FENTANYL CITRATE 50 UG/ML
100 INJECTION, SOLUTION INTRAMUSCULAR; INTRAVENOUS ONCE
Status: DISCONTINUED | OUTPATIENT
Start: 2022-04-20 | End: 2022-04-20 | Stop reason: HOSPADM

## 2022-04-20 RX ORDER — MIDAZOLAM HYDROCHLORIDE 1 MG/ML
2 INJECTION, SOLUTION INTRAMUSCULAR; INTRAVENOUS ONCE
Status: DISCONTINUED | OUTPATIENT
Start: 2022-04-20 | End: 2022-04-20 | Stop reason: HOSPADM

## 2022-04-20 RX ORDER — LIDOCAINE HYDROCHLORIDE 10 MG/ML
0.1 INJECTION INFILTRATION; PERINEURAL AS NEEDED
Status: DISCONTINUED | OUTPATIENT
Start: 2022-04-20 | End: 2022-04-20 | Stop reason: HOSPADM

## 2022-04-20 RX ORDER — PROPOFOL 10 MG/ML
INJECTION, EMULSION INTRAVENOUS AS NEEDED
Status: DISCONTINUED | OUTPATIENT
Start: 2022-04-20 | End: 2022-04-20 | Stop reason: HOSPADM

## 2022-04-20 RX ORDER — LIDOCAINE HYDROCHLORIDE 20 MG/ML
INJECTION, SOLUTION EPIDURAL; INFILTRATION; INTRACAUDAL; PERINEURAL AS NEEDED
Status: DISCONTINUED | OUTPATIENT
Start: 2022-04-20 | End: 2022-04-20 | Stop reason: HOSPADM

## 2022-04-20 RX ORDER — SODIUM CHLORIDE, SODIUM LACTATE, POTASSIUM CHLORIDE, CALCIUM CHLORIDE 600; 310; 30; 20 MG/100ML; MG/100ML; MG/100ML; MG/100ML
100 INJECTION, SOLUTION INTRAVENOUS CONTINUOUS
Status: DISCONTINUED | OUTPATIENT
Start: 2022-04-20 | End: 2022-04-20 | Stop reason: HOSPADM

## 2022-04-20 RX ORDER — PROPOFOL 10 MG/ML
INJECTION, EMULSION INTRAVENOUS
Status: DISCONTINUED | OUTPATIENT
Start: 2022-04-20 | End: 2022-04-20 | Stop reason: HOSPADM

## 2022-04-20 RX ADMIN — SODIUM CHLORIDE, SODIUM LACTATE, POTASSIUM CHLORIDE, AND CALCIUM CHLORIDE 100 ML/HR: 600; 310; 30; 20 INJECTION, SOLUTION INTRAVENOUS at 08:45

## 2022-04-20 RX ADMIN — PROPOFOL 50 MG: 10 INJECTION, EMULSION INTRAVENOUS at 08:57

## 2022-04-20 RX ADMIN — PROPOFOL 140 MCG/KG/MIN: 10 INJECTION, EMULSION INTRAVENOUS at 08:57

## 2022-04-20 RX ADMIN — LIDOCAINE HYDROCHLORIDE 60 MG: 20 INJECTION, SOLUTION EPIDURAL; INFILTRATION; INTRACAUDAL; PERINEURAL at 08:57

## 2022-04-20 NOTE — ANESTHESIA PREPROCEDURE EVALUATION
Relevant Problems   NEUROLOGY   (+) ADD (attention deficit disorder)   (+) Depression      GASTROINTESTINAL   (+) GERD (gastroesophageal reflux disease)     Relevant Problems   NEUROLOGY   (+) ADD (attention deficit disorder)   (+) Depression      GASTROINTESTINAL   (+) GERD (gastroesophageal reflux disease)       Anesthetic History   No history of anesthetic complications            Review of Systems / Medical History  Patient summary reviewed and pertinent labs reviewed    Pulmonary  Within defined limits                 Neuro/Psych         Psychiatric history    Comments: ADD  Neurogenic bladder  C6 quadriplegia after spine fx surgery Cardiovascular                  Exercise tolerance: <4 METS     GI/Hepatic/Renal     GERD          Comments: Admitted with constipation/abd pain Endo/Other             Other Findings   Comments: Constipation and abdominal pain. Possible rectal mass on rectal exam.  Normal hgb.          Physical Exam    Airway  Mallampati: II  TM Distance: 4 - 6 cm  Neck ROM: decreased range of motion   Mouth opening: Normal     Cardiovascular    Rhythm: regular  Rate: normal         Dental  No notable dental hx       Pulmonary  Breath sounds clear to auscultation               Abdominal         Other Findings            Anesthetic Plan    ASA: 3  Anesthesia type: total IV anesthesia            Anesthetic plan and risks discussed with: Patient and Mother              Anesthetic History   No history of anesthetic complications            Review of Systems / Medical History  Patient summary reviewed and pertinent labs reviewed    Pulmonary  Within defined limits                 Neuro/Psych         Psychiatric history    Comments: ADD  Neurogenic bladder  C6 quadriplegia after spine fx surgery Cardiovascular                  Exercise tolerance: <4 METS     GI/Hepatic/Renal     GERD          Comments: Admitted with constipation/abd pain Endo/Other             Other Findings   Comments: Constipation and abdominal pain. Possible rectal mass on rectal exam.  Normal hgb.            Physical Exam    Airway  Mallampati: II  TM Distance: 4 - 6 cm  Neck ROM: decreased range of motion   Mouth opening: Normal     Cardiovascular    Rhythm: regular  Rate: normal         Dental  No notable dental hx       Pulmonary  Breath sounds clear to auscultation               Abdominal         Other Findings            Anesthetic Plan    ASA: 3  Anesthesia type: total IV anesthesia            Anesthetic plan and risks discussed with: Patient

## 2022-04-20 NOTE — H&P
History and Physical for Procedure             Date: 4/20/2022     History of Present Illness:  Patient presents to undergo EGD.        Past Medical History:   Diagnosis Date    Abdominal pain     ADD (attention deficit disorder)     Anxiety     Back pain     Chronic cystitis 4/3/2014    Closed fracture of left fibula and tibia     Depression     Edema     Flank pain     Fusion of spine, cervical region     GERD (gastroesophageal reflux disease)     Gross hematuria     Hand deformities, acquired     Hand pain     Herpes simplex     Incontinence of urine in female     Left ankle pain     Neurogenic bladder 4/3/2014    Neurogenic bladder     Neurogenic bladder, NOS     Neuropathy     Obesity     Other chronic cystitis     Paraplegia (HCC)     Quadriplegia (HCC)     Urinary tract infection, site not specified      Past Surgical History:   Procedure Laterality Date    FLEXIBLE SIGMOIDOSCOPY N/A 3/28/2019    SIGMOIDOSCOPY FLEXIBLE performed by Celeste Kraus [facility] at [facility] ENDOSCOPY    HX ORTHOPAEDIC  1994    spinal fusion    HX ORTHOPAEDIC      HX OTHER SURGICAL      implanted stimulator with 8 electrodes     In and  Family History   Problem Relation Age of Onset    Heart Disease Mother     Hypertension Mother     Coronary Art Dis Mother     Cancer Other         gen fam HX    Diabetes Other         gen fam HX    High Cholesterol Other         gen fam HX    Breast Cancer Maternal Aunt     Coronary Art Dis Maternal Uncle     Heart Disease Maternal Grandmother     Coronary Art Dis Maternal Grandmother     Heart Disease Maternal Grandfather     Coronary Art Dis Maternal Grandfather      Social History     Tobacco Use    Smoking status: Never Smoker    Smokeless tobacco: Never Used   Substance Use Topics    Alcohol use: Yes     Comment: weekends        Allergies   Allergen Reactions    Penicillins Unknown (comments) and Other (comments)     Patient had reaction during childhood that she believes caused her to lose temporary feeling in her legs.  Povidone-Iodine Hives and Rash     No current facility-administered medications for this encounter. Current Outpatient Medications   Medication Sig    fluconazole (DIFLUCAN) 150 mg tablet Take one today and repeat in 4 days.  acyclovir (ZOVIRAX) 400 mg tablet Take 1 Tablet by mouth three (3) times daily. For 5 days    ciprofloxacin HCl (CIPRO) 500 mg tablet Take 1 Tablet by mouth two (2) times a day.  polyethylene glycol (MIRALAX) 17 gram packet Take 1 Packet by mouth two (2) times a day.  linaclotide (LINZESS) 290 mcg cap capsule Take  by mouth Daily (before breakfast).  HYDROcodone-acetaminophen (NORCO) 7.5-325 mg per tablet Take 1 Tab by mouth every eight (8) hours. Max Daily Amount: 3 Tabs.  naloxegol (MOVANTIK) 25 mg tab tablet Take 1 Tab by mouth Daily (before breakfast). (Patient not taking: Reported on 9/28/2021)    traMADol (ULTRAM) 50 mg tablet Take 1 Tab by mouth every six (6) hours as needed for Pain. Max Daily Amount: 200 mg. (Patient not taking: Reported on 9/28/2021)    gabapentin (NEURONTIN) 600 mg tablet Take 1 Tab by mouth three (3) times daily.  ibuprofen (MOTRIN) 800 mg tablet Take 1 Tab by mouth every eight (8) hours. Review of Systems:  A detailed 10 organ review of systems is obtained with pertinent positives as listed in the History of Present Illness. All others are negative. Objective:     Physical Exam:  There were no vitals taken for this visit. General:  Alert and oriented. Heart: Regular rate and rhythm  Lungs:  Clear to auscultation bilaterally  Abdomen: Soft, nontender, nondistended    Impression/Plan:     Proceed with EGD as planned. I have discussed with the patient the technique, benefits, alternatives, and risks of these procedures, including medication reaction, immediate or delayed bleeding, or perforation of the gastrointestinal tract.      Signed By: Jo Ann Stevens MD April 20, 2022

## 2022-04-20 NOTE — ANESTHESIA POSTPROCEDURE EVALUATION
Procedure(s):  ESOPHAGOGASTRODUODENOSCOPY (EGD)/BMI 25  ESOPHAGOGASTRODUODENAL (EGD) BIOPSY. total IV anesthesia    Anesthesia Post Evaluation      Multimodal analgesia: multimodal analgesia used between 6 hours prior to anesthesia start to PACU discharge  Patient location during evaluation: PACU  Patient participation: complete - patient participated  Level of consciousness: awake and awake and alert  Pain management: adequate  Airway patency: patent  Anesthetic complications: no  Cardiovascular status: acceptable  Respiratory status: acceptable  Hydration status: acceptable  Post anesthesia nausea and vomiting:  controlled      INITIAL Post-op Vital signs:   Vitals Value Taken Time   BP 95/50 04/20/22 0922   Temp     Pulse 73 04/20/22 0927   Resp     SpO2 97 % 04/20/22 0927   Vitals shown include unvalidated device data.

## 2022-04-20 NOTE — OP NOTES
Procedure:  Esophagogastroduodenoscopy    Date of Procedure:  4/20/2022    Patient:  Thi Maxwell     1980    Indication:  Nausea, GERD     Sedation:  MAC    Pre-Procedure Physical Exam:    Mental status:  alert and oriented  Airway:  normal oropharyngeal airway and neck mobility  CV:  regular rate and rhythm  Respiratory:  clear to auscultation    Procedure:  A History and Physical has been performed, and patient medication allergies have been reviewed. Risks of perforation, hemorrhage, adverse drug reaction, and aspiration were discussed. Informed consent was obtained for the procedure, including sedation. The patient was placed in the left lateral decubitus position. The heart rate, oxygen saturations, blood pressure, and response to care were monitored throughout the procedure. The gastroscope was passed through the mouth and advanced under direct vision to the second portion of the duodenum. A careful inspection was made as the gastroscope was withdrawn, including a retroflexed view of the proximal stomach. The patient tolerated the procedure well. Findings:     OROPHARYNX: Cords and pyriform recesses appear normal.   ESOPHAGUS: The esophagus is normal. The proximal, mid, and distal portions are normal. The Z-Line is intact. STOMACH: On retroflexion, the flap-valve is classified as Hill Grade II, with a tissue fold at the lesser curvature but with periodic opening and closing around the endoscope. A small amount of retained food and vegetable debris was seen in the gastric body along the greater curvature. Mild erythematous gastritis was seen in the antrum. Biopsies of the antrum and body were obtained for H pylori. DUODENUM: The bulb and second portions are normal.    Specimen:  Yes    Estimated Blood Loss:  Minimal    Implant:  None           Impression:    1. Small gastric bezoar.  This likely represents delayed gastric emptying associated with use of opiate pain medication. 2. Mild gastritis. Plan:  1. Follow up pathology results. Treat H pylori if positive. 2. Avoid NSAIDs such as Ibuprofen and Naproxen. 3. Omeprazole 40 mg once daily. 4. Reglan 5 mg po qac/hs prn. I have informed patient about the FDA black box warning for tardive dyskinesia. If any neurologic adverse affects are encountered, patient has been advised to discontinue the medication and contact us. 5. Low-residue diet. 6. Minimize use of opiates. 7. Return to office in 2-3 months.      Signed:  Marcella Tian MD  4/20/2022  9:16 AM

## 2022-07-22 ENCOUNTER — HOSPITAL ENCOUNTER (OUTPATIENT)
Dept: NUCLEAR MEDICINE | Age: 42
Discharge: HOME | End: 2022-07-25
Payer: COMMERCIAL

## 2022-07-22 ENCOUNTER — APPOINTMENT (OUTPATIENT)
Dept: NUCLEAR MEDICINE | Age: 42
End: 2022-07-22
Payer: COMMERCIAL

## 2022-07-22 DIAGNOSIS — R11.0 NAUSEA: ICD-10-CM

## 2022-07-22 DIAGNOSIS — T18.2XXD FOREIGN BODY IN STOMACH, SUBSEQUENT ENCOUNTER: ICD-10-CM

## 2022-07-22 PROCEDURE — 3430000000 HC RX DIAGNOSTIC RADIOPHARMACEUTICAL: Performed by: INTERNAL MEDICINE

## 2022-07-22 PROCEDURE — 78264 GASTRIC EMPTYING IMG STUDY: CPT

## 2022-07-22 PROCEDURE — A9541 TC99M SULFUR COLLOID: HCPCS | Performed by: INTERNAL MEDICINE

## 2022-07-22 RX ADMIN — Medication 1 MILLICURIE: at 08:30

## 2022-10-26 NOTE — ROUTINE PROCESS
VSS. Discharge instructions reviewed with patient and fiance and copy of instructions sent home with patient. Dr. Shanel Garcia spoke with patient and fianceprior to discharge. Questions answered. Discharged via car, wheeled out by [employer]. IV discontinued prior to discharge.      Personal items with patient at discharge: Wheelchair
sore nipples/knowledge deficit/latch on difficulty

## 2023-02-10 ENCOUNTER — INITIAL CONSULT (OUTPATIENT)
Dept: CARDIOLOGY CLINIC | Age: 43
End: 2023-02-10

## 2023-02-10 VITALS — HEART RATE: 83 BPM | DIASTOLIC BLOOD PRESSURE: 62 MMHG | HEIGHT: 66 IN | SYSTOLIC BLOOD PRESSURE: 108 MMHG

## 2023-02-10 DIAGNOSIS — Z76.89 ENCOUNTER TO ESTABLISH CARE: Primary | ICD-10-CM

## 2023-02-10 DIAGNOSIS — R07.2 PRECORDIAL PAIN: ICD-10-CM

## 2023-02-10 DIAGNOSIS — Z82.49 FAMILY HISTORY OF ISCHEMIC HEART DISEASE (IHD): ICD-10-CM

## 2023-02-10 DIAGNOSIS — G82.20 PARAPLEGIA (HCC): ICD-10-CM

## 2023-02-10 DIAGNOSIS — R94.31 EKG ABNORMALITY: ICD-10-CM

## 2023-02-10 RX ORDER — ONDANSETRON 4 MG/1
TABLET, ORALLY DISINTEGRATING ORAL
COMMUNITY
Start: 2022-12-12

## 2023-02-10 RX ORDER — OMEPRAZOLE 40 MG/1
CAPSULE, DELAYED RELEASE ORAL
COMMUNITY
Start: 2023-02-06

## 2023-02-10 RX ORDER — LISDEXAMFETAMINE DIMESYLATE 50 MG
CAPSULE ORAL
COMMUNITY
Start: 2023-01-18

## 2023-02-10 RX ORDER — FAMOTIDINE 20 MG/1
TABLET, FILM COATED ORAL
COMMUNITY
Start: 2023-02-02

## 2023-02-10 RX ORDER — ALBUTEROL SULFATE 90 UG/1
1-2 AEROSOL, METERED RESPIRATORY (INHALATION) EVERY 6 HOURS PRN
COMMUNITY
Start: 2021-12-29

## 2023-02-10 RX ORDER — METOCLOPRAMIDE 5 MG/1
TABLET ORAL
COMMUNITY
Start: 2022-12-29

## 2023-02-10 NOTE — PROGRESS NOTES
[address], [address]  PHONE: [phone]        02/10/23    NAME:  Chiki Vivar  : 1980  MRN: [id number]       SUBJECTIVE:   Chiki Vivar is a 38 y.o. female seen for a consultation visit regarding the following:     Chief Complaint   Patient presents with    Consultation    Chest Pain          HPI:  Consultation is requested by Juan Manuel Xie MD for evaluation of Consultation and Chest Pain  She has been struggling with GI issues, but more CP now. Tough to tell between GI and cardiac pains. No LE edema. Some SOB. She has COPD, 2nd hand smoke. Been in wheel chair since 14yo. CP can happen while laying down. Patient denies recent history of orthopnea, PND, excessive dizziness and/or syncope. Had COVID x 2. Mother with MI at 42yo. Past Medical History, Past Surgical History, Family history, Social History, and Medications were all reviewed with the patient today and updated as necessary. Current Outpatient Medications   Medication Sig Dispense Refill    VYVANSE 50 MG capsule TAKE 1 CAPSULE BY MOUTH EVERY DAY      metoclopramide (REGLAN) 5 MG tablet TAKE 1 TABLET BY MOUTH TWICE DAILY      omeprazole (PRILOSEC) 40 MG delayed release capsule       ondansetron (ZOFRAN-ODT) 4 MG disintegrating tablet DISSOLVE 1 TABLET ON THE TONGUE THREE TIMES DAILY      famotidine (PEPCID) 20 MG tablet TAKE 1 TABLET BY MOUTH EVERY DAY AT BEDTIME      albuterol sulfate HFA (PROVENTIL;VENTOLIN;PROAIR) 108 (90 Base) MCG/ACT inhaler Inhale 1-2 puffs into the lungs every 6 hours as needed      ciprofloxacin (CIPRO) 500 MG tablet Take 500 mg by mouth 2 times daily      HYDROcodone-acetaminophen (NORCO) 7.5-325 MG per tablet Take 1 tablet by mouth every 8 hours.       linaclotide (LINZESS) 290 MCG CAPS capsule Take by mouth every morning (before breakfast)      polyethylene glycol (GLYCOLAX) 17 GM/SCOOP powder Take 17 g by mouth 2 times daily No current facility-administered medications for this visit. Allergies   Allergen Reactions    Penicillins Other (See Comments)     Patient had reaction during childhood that she believes caused her to lose temporary feeling in her legs. Povidone-Iodine Hives and Rash     Patient Active Problem List    Diagnosis Date Noted    Precordial pain 02/10/2023     Priority: Medium    Family history of ischemic heart disease (IHD) 02/10/2023     Priority: Medium    EKG abnormality 02/10/2023     Priority: Medium    Abdominal pain 03/27/2019    Carpal tunnel syndrome on both sides 09/18/2018    Pain in both wrists 09/18/2018    Paraplegia (HCC)     Depression     Anxiety     ADD (attention deficit disorder)     GERD (gastroesophageal reflux disease)     Quadriplegia (HCC)      Post-traumatic        Back pain     Neurogenic bladder 04/03/2014    Chronic cystitis 04/03/2014      Past Surgical History:   Procedure Laterality Date    FLEXIBLE SIGMOIDOSCOPY N/A 3/28/2019    SIGMOIDOSCOPY FLEXIBLE performed by Genoveva Thorne MD at [address] [place] [address]    spinal fusion    [address] [place] [address]      implanted stimulator with 8 electrodes     Family History   Problem Relation Age of Onset    Coronary Art Dis Maternal Grandmother     Heart Disease Maternal Grandmother     Coronary Art Dis Maternal Uncle     Breast Cancer Maternal Aunt     Coronary Art Dis Maternal Grandfather     High Cholesterol Other         gen fam HX    Heart Disease Mother     Hypertension Mother     Coronary Art Dis Mother     Cancer Other         gen fam HX    Diabetes Other         gen fam HX    Heart Disease Maternal Grandfather      Social History     Tobacco Use    Smoking status: Never    Smokeless tobacco: Never   Substance Use Topics    Alcohol use: Yes       ROS:    Constitutional:   Negative for fevers and unexplained weight loss. Eyes:   Negative for visual disturbance.   ENT: Negative for significant hearing loss and tinnitus. Respiratory:   Negative for hemoptysis. Cardiovascular:   Negative except as noted in HPI. Gastrointestinal:   Negative for melena and abdominal pain. Genitourinary:   Negative for hematuria, renal stones. Integumentary:   Negative for rash or non-healing wounds  Hematologic/Lymphatic:   Negative for excessive bleeding hx or clotting disorder. Musculoskeletal:  Negative for active, unexplained/severe joint pain. Neurological:   Negative for stroke. Behavioral/Psych:   Negative for suicidal ideations. Endocrine:   Negative for uncontrolled diabetic symptoms including polyuria, polydipsia and poor wound healing. PHYSICAL EXAM:     /62   Pulse 83   Ht 5' 6\" (1.676 m)    General/Constitutional:   Alert and oriented x 3, no acute distress  HEENT:   normocephalic, atraumatic, moist mucous membranes  Neck:   No JVD or carotid bruits bilaterally  Cardiovascular:   regular rate and rhythm, no murmur/rub/gallop appreciated  Pulmonary:   clear to auscultation bilaterally, no respiratory distress  Abdomen:   soft, non-tender, non-distended  Ext:   No sig LE edema bilaterally  Skin:  warm and dry, no obvious rashes seen  Neuro:   no obvious sensory or motor deficits  Psychiatric:   normal mood and affect    EKG Today and independently reviewed by me: sinus rhythm, normal intervals and non-specific ST/T wave changes. Medical problems, medical history and test results were reviewed with the patient today.        No results found for: NA, K, CL, CO2, BUN, CREATININE, GLUCOSE, CALCIUM     No results found for: WBC, HGB, HCT, MCV, PLT    No results found for: TSHFT4, TSH    No results found for: LABA1C  No results found for: EAG      No results found for: CHOL  No results found for: TRIG  No results found for: HDL  No results found for: LDLCHOLESTEROL, LDLCALC  No results found for: LABVLDL, VLDL  No results found for: CHOLHDLRATIO        I have Independently reviewed prior care notes, any ER records available, cardiac testing, labs and results with the patient and before seeing the patient today. Also independently reviewed outside records when available. ASSESSMENT:    Christel was seen today for consultation and chest pain. Diagnoses and all orders for this visit:    Encounter to establish care  -     EKG 12 lead    Precordial pain  -     Transthoracic echocardiogram (TTE) complete with contrast, bubble, strain, and 3D PRN; Future  -     CT CARDIAC CALCIUM SCORING; Future    Paraplegia (HCC)  -     Transthoracic echocardiogram (TTE) complete with contrast, bubble, strain, and 3D PRN; Future  -     CT CARDIAC CALCIUM SCORING; Future    Family history of ischemic heart disease (IHD)  -     Transthoracic echocardiogram (TTE) complete with contrast, bubble, strain, and 3D PRN; Future  -     CT CARDIAC CALCIUM SCORING; Future    EKG abnormality        PLAN:     Mother with MI at her age. She has CP, more sx.  GI? Cardiac? Check echo, Ca score. Cannot walk, wheel chair bound. The patient has been instructed to call with any angina or equivalent as reviewed today. All questions were answered with the patient voicing complete understanding. No illness. Patient has been instructed and agrees to call our office with any issues or other concerns related to their cardiac condition(s) and/or complaint(s).         Return for Return After Test.       LENNY ONTIVEROS,   2/10/2023

## 2023-02-13 ENCOUNTER — HOSPITAL ENCOUNTER (OUTPATIENT)
Dept: NUCLEAR MEDICINE | Age: 43
Discharge: HOME | End: 2023-02-16
Payer: COMMERCIAL

## 2023-02-13 VITALS — WEIGHT: 150 LBS | BODY MASS INDEX: 24.21 KG/M2

## 2023-02-13 DIAGNOSIS — R11.0 NAUSEA: ICD-10-CM

## 2023-02-13 DIAGNOSIS — R10.13 EPIGASTRIC PAIN: ICD-10-CM

## 2023-02-13 DIAGNOSIS — R11.10 VOMITING, UNSPECIFIED VOMITING TYPE, UNSPECIFIED WHETHER NAUSEA PRESENT: ICD-10-CM

## 2023-02-13 PROCEDURE — 6360000004 HC RX CONTRAST MEDICATION: Performed by: PHYSICIAN ASSISTANT

## 2023-02-13 PROCEDURE — A9537 TC99M MEBROFENIN: HCPCS | Performed by: PHYSICIAN ASSISTANT

## 2023-02-13 PROCEDURE — 3430000000 HC RX DIAGNOSTIC RADIOPHARMACEUTICAL: Performed by: PHYSICIAN ASSISTANT

## 2023-02-13 PROCEDURE — 78227 HEPATOBIL SYST IMAGE W/DRUG: CPT

## 2023-02-13 RX ORDER — KIT FOR THE PREPARATION OF TECHNETIUM TC 99M MEBROFENIN 45 MG/10ML
6 INJECTION, POWDER, LYOPHILIZED, FOR SOLUTION INTRAVENOUS ONCE
Status: COMPLETED | OUTPATIENT
Start: 2023-02-13 | End: 2023-02-13

## 2023-02-13 RX ADMIN — MEBROFENIN 6 MILLICURIE: 45 INJECTION, POWDER, LYOPHILIZED, FOR SOLUTION INTRAVENOUS at 08:02

## 2023-02-13 RX ADMIN — SINCALIDE 1.36 MCG: 5 INJECTION, POWDER, LYOPHILIZED, FOR SOLUTION INTRAVENOUS at 08:47

## 2023-02-24 ENCOUNTER — TELEPHONE (OUTPATIENT)
Dept: CARDIOLOGY CLINIC | Age: 43
End: 2023-02-24

## 2023-02-24 NOTE — TELEPHONE ENCOUNTER
----- Message from Flip Bryant DO sent at 2/21/2023  2:01 PM EST -----  Please call the patient. ECHO was ok, nothing major or concerning. If having more angina (worsening HOOD, CP, SOB), please let us know. Will review more at follow up appointment and get plan for the future.     Thanks,   Erven Holstein

## 2023-03-02 ENCOUNTER — HOSPITAL ENCOUNTER (OUTPATIENT)
Dept: CT IMAGING | Age: 43
Discharge: HOME | End: 2023-03-05

## 2023-03-02 DIAGNOSIS — Z82.49 FAMILY HISTORY OF ISCHEMIC HEART DISEASE (IHD): ICD-10-CM

## 2023-03-02 DIAGNOSIS — R07.2 PRECORDIAL PAIN: ICD-10-CM

## 2023-03-02 DIAGNOSIS — G82.20 PARAPLEGIA (HCC): ICD-10-CM

## 2023-03-02 NOTE — TELEPHONE ENCOUNTER
----- Message from Dutch Monreal DO sent at 3/2/2023 10:15 AM EST -----  Please call her, Ca score of zero, great news. See me as planned, echo was good as well. Call for issues.    Thanks

## 2023-03-16 ENCOUNTER — OFFICE VISIT (OUTPATIENT)
Dept: CARDIOLOGY CLINIC | Age: 43
End: 2023-03-16
Payer: COMMERCIAL

## 2023-03-16 VITALS
HEIGHT: 66 IN | BODY MASS INDEX: 24.21 KG/M2 | DIASTOLIC BLOOD PRESSURE: 70 MMHG | HEART RATE: 86 BPM | SYSTOLIC BLOOD PRESSURE: 104 MMHG

## 2023-03-16 DIAGNOSIS — R07.2 PRECORDIAL PAIN: ICD-10-CM

## 2023-03-16 DIAGNOSIS — R94.31 EKG ABNORMALITY: Primary | ICD-10-CM

## 2023-03-16 PROCEDURE — 99213 OFFICE O/P EST LOW 20 MIN: CPT | Performed by: INTERNAL MEDICINE

## 2023-03-16 NOTE — PROGRESS NOTES
[address], [address] [email or web address]  PHONE: [phone]     23    NAME:  Elizabeth Cueto  : 1980  MRN: [id number]       SUBJECTIVE:   Elizabeth Cueto is a 38 y.o. female seen for a follow up visit regarding the following:     Chief Complaint   Patient presents with    Chest Pain     CA SCORE AND ECHO        HPI:   Echo 2023: normal EF  The calcium score is 0    Some GI issues, no new angina. Dx with COPD. No new HOOD>    Been in wheel chair since 14yo. CP can happen while laying down. Patient denies recent history of orthopnea, PND, excessive dizziness and/or syncope. Had COVID x 2. Mother with MI at 44yo. Past Medical History, Past Surgical History, Family history, Social History, and Medications were all reviewed with the patient today and updated as necessary. Current Outpatient Medications   Medication Sig Dispense Refill    VYVANSE 50 MG capsule TAKE 1 CAPSULE BY MOUTH EVERY DAY      omeprazole (PRILOSEC) 40 MG delayed release capsule       ondansetron (ZOFRAN-ODT) 4 MG disintegrating tablet DISSOLVE 1 TABLET ON THE TONGUE THREE TIMES DAILY      famotidine (PEPCID) 20 MG tablet as needed      albuterol sulfate HFA (PROVENTIL;VENTOLIN;PROAIR) 108 (90 Base) MCG/ACT inhaler Inhale 1-2 puffs into the lungs every 6 hours as needed      HYDROcodone-acetaminophen (NORCO) 7.5-325 MG per tablet Take 1 tablet by mouth every 8 hours. linaclotide (LINZESS) 290 MCG CAPS capsule Take by mouth every morning (before breakfast)      polyethylene glycol (GLYCOLAX) 17 GM/SCOOP powder Take 17 g by mouth 2 times daily      metoclopramide (REGLAN) 5 MG tablet TAKE 1 TABLET BY MOUTH TWICE DAILY (Patient not taking: Reported on 3/16/2023)      ciprofloxacin (CIPRO) 500 MG tablet Take 500 mg by mouth 2 times daily (Patient not taking: Reported on 3/16/2023)       No current facility-administered medications for this visit.         Allergies   Allergen Reactions    Penicillins Other (See Comments)     Patient had reaction during childhood that she believes caused her to lose temporary feeling in her legs. Povidone-Iodine Hives and Rash     Patient Active Problem List    Diagnosis Date Noted    Precordial pain 02/10/2023     Priority: Medium    Family history of ischemic heart disease (IHD) 02/10/2023     Priority: Medium    EKG abnormality 02/10/2023     Priority: Medium    Abdominal pain 03/27/2019    Carpal tunnel syndrome on both sides 09/18/2018    Pain in both wrists 09/18/2018    Paraplegia (HCC)     Depression     Anxiety     ADD (attention deficit disorder)     GERD (gastroesophageal reflux disease)     Quadriplegia (HCC)      Post-traumatic        Back pain     Neurogenic bladder 04/03/2014    Chronic cystitis 04/03/2014      Past Surgical History:   Procedure Laterality Date    FLEXIBLE SIGMOIDOSCOPY N/A 3/28/2019    SIGMOIDOSCOPY FLEXIBLE performed by Chastity Troy MD at [address] [place] [address]    spinal fusion    [address] [place] [address]      implanted stimulator with 8 electrodes     Family History   Problem Relation Age of Onset    Coronary Art Dis Maternal Grandmother     Heart Disease Maternal Grandmother     Coronary Art Dis Maternal Uncle     Breast Cancer Maternal Aunt     Coronary Art Dis Maternal Grandfather     High Cholesterol Other         gen fam HX    Heart Disease Mother     Hypertension Mother     Coronary Art Dis Mother     Cancer Other         gen fam HX    Diabetes Other         gen fam HX    Heart Disease Maternal Grandfather      Social History     Tobacco Use    Smoking status: Never    Smokeless tobacco: Never   Substance Use Topics    Alcohol use: Yes         ROS:    No obvious pertinent positives on review of systems except for what was outlined in the HPI today.       PHYSICAL EXAM:     /70   Pulse 86   Ht 5' 6\" (1.676 m)   BMI 24.21 kg/m²    General/Constitutional: Alert and oriented x 3, no acute distress  HEENT:   normocephalic, atraumatic, moist mucous membranes  Neck:   No JVD or carotid bruits bilaterally  Cardiovascular:   regular rate and rhythm, no murmur/rub/gallop appreciated  Pulmonary:   clear to auscultation bilaterally, no respiratory distress  Abdomen:   soft, non-tender, non-distended  Ext:   No sig LE edema bilaterally  Skin:  warm and dry, no obvious rashes seen  Neuro:   no obvious sensory or motor deficits  Psychiatric:   normal mood and affect      No results found for: NA, K, CL, CO2, BUN, CREATININE, GLUCOSE, CALCIUM     No results found for: WBC, HGB, HCT, MCV, PLT    No results found for: TSHFT4, TSH    No results found for: LABA1C  No results found for: EAG    No results found for: CHOL  No results found for: TRIG  No results found for: HDL  No results found for: LDLCHOLESTEROL, LDLCALC  No results found for: LABVLDL, VLDL  No results found for: CHOLHDLRATIO        I have Independently reviewed prior care notes, any ER records available, cardiac testing, labs and results with the patient and before seeing the patient today. Also independently reviewed outside records when available. ASSESSMENT:    Christel was seen today for chest pain. Diagnoses and all orders for this visit:    EKG abnormality    Precordial pain        PLAN:     Echo ok, Ca score zero. Low risk, follow for more angina. The patient has been instructed to call with any angina or equivalent as reviewed today. All questions were answered with the patient voicing complete understanding. Back to GI, call us PRN. Patient has been instructed and agrees to call our office with any issues or other concerns related to their cardiac condition(s) and/or complaint(s). Return if symptoms worsen or fail to improve.        Diamond Campos,   3/16/2023

## 2023-07-06 NOTE — DISCHARGE INSTRUCTIONS
Gastrointestinal Esophagogastroduodenoscopy (EGD)/ Endoscopic Ultrasound(EUS)- Upper Exam Discharge Instructions    1. Call Dr. Prashanth Pereira  for any problems or questions. [phone])  2. Contact the doctor's office for follow up appointment as directed. 3. Medication may cause drowsiness for several hours, therefore, do not drive or operate machinery for remainder of the day. 4. No alcohol today. 5. Do not make any important decisions such as signing legal paperwork. 6. Ordinarily, you may resume regular diet and activity after exam unless otherwise specified by your physician. 7. For mild soreness in your throat you may use Cepacol throat lozenges or warm  salt-water gargles as needed. Any additional instructions:   1. Follow up pathology results. Treat H pylori if positive. 2. Avoid NSAIDs such as Ibuprofen and Naproxen. 3. Omeprazole 40 mg once daily. 4. Reglan 5 mg po qac/hs prn. I have informed patient about the FDA black box warning for tardive dyskinesia. If any neurologic adverse affects are encountered, patient has been advised to discontinue the medication and contact us. 5. Low-residue diet. 6. Minimize use of opiates. 7. Return to office in 2-3 months. Inst[facility] to [facility] and other family members. Erivedge Counseling- I discussed with the patient the risks of Erivedge including but not limited to nausea, vomiting, diarrhea, constipation, weight loss, changes in the sense of taste, decreased appetite, muscle spasms, and hair loss.  The patient verbalized understanding of the proper use and possible adverse effects of Erivedge.  All of the patient's questions and concerns were addressed.

## 2023-11-09 ENCOUNTER — TELEPHONE (OUTPATIENT)
Dept: OBGYN CLINIC | Age: 43
End: 2023-11-09

## 2023-11-09 ENCOUNTER — OFFICE VISIT (OUTPATIENT)
Dept: OBGYN CLINIC | Age: 43
End: 2023-11-09
Payer: COMMERCIAL

## 2023-11-09 VITALS — SYSTOLIC BLOOD PRESSURE: 98 MMHG | DIASTOLIC BLOOD PRESSURE: 68 MMHG

## 2023-11-09 DIAGNOSIS — R10.10 PAIN OF UPPER ABDOMEN: ICD-10-CM

## 2023-11-09 DIAGNOSIS — K82.0 CONTRACTION, GALLBLADDER: ICD-10-CM

## 2023-11-09 DIAGNOSIS — Z12.31 SCREENING MAMMOGRAM, ENCOUNTER FOR: ICD-10-CM

## 2023-11-09 DIAGNOSIS — R10.2 PELVIC PAIN: Primary | ICD-10-CM

## 2023-11-09 DIAGNOSIS — N39.0 URINARY TRACT INFECTION WITH HEMATURIA, SITE UNSPECIFIED: ICD-10-CM

## 2023-11-09 DIAGNOSIS — Z12.4 SCREENING FOR MALIGNANT NEOPLASM OF CERVIX: ICD-10-CM

## 2023-11-09 DIAGNOSIS — K83.9 BILE DUCT ABNORMALITY: ICD-10-CM

## 2023-11-09 DIAGNOSIS — R31.9 URINARY TRACT INFECTION WITH HEMATURIA, SITE UNSPECIFIED: ICD-10-CM

## 2023-11-09 LAB
BILIRUBIN, URINE, POC: NEGATIVE
BLOOD URINE, POC: NORMAL
GLUCOSE URINE, POC: NEGATIVE
KETONES, URINE, POC: NEGATIVE
LEUKOCYTE ESTERASE, URINE, POC: NORMAL
NITRITE, URINE, POC: NEGATIVE
PH, URINE, POC: 5 (ref 4.6–8)
PROTEIN,URINE, POC: NEGATIVE
SPECIFIC GRAVITY, URINE, POC: 1.03 (ref 1–1.03)
URINALYSIS CLARITY, POC: NORMAL
URINALYSIS COLOR, POC: NORMAL
UROBILINOGEN, POC: NORMAL

## 2023-11-09 PROCEDURE — 99214 OFFICE O/P EST MOD 30 MIN: CPT | Performed by: OBSTETRICS & GYNECOLOGY

## 2023-11-09 PROCEDURE — 81003 URINALYSIS AUTO W/O SCOPE: CPT | Performed by: OBSTETRICS & GYNECOLOGY

## 2023-11-09 RX ORDER — FLUTICASONE FUROATE, UMECLIDINIUM BROMIDE AND VILANTEROL TRIFENATATE 100; 62.5; 25 UG/1; UG/1; UG/1
1 POWDER RESPIRATORY (INHALATION) DAILY
COMMUNITY
Start: 2023-08-26

## 2023-11-09 RX ORDER — ZOLPIDEM TARTRATE 10 MG/1
10 TABLET ORAL
COMMUNITY
Start: 2023-10-27

## 2023-11-09 RX ORDER — NITROFURANTOIN 25; 75 MG/1; MG/1
CAPSULE ORAL
Qty: 20 CAPSULE | Refills: 5 | Status: SHIPPED | OUTPATIENT
Start: 2023-11-09

## 2023-11-09 NOTE — PROGRESS NOTES
SIVA Cassidy is a 37 y.o. female seen for abdominal and pelvic pain. Her pain is mainly in the upper abdomen but she is having pain in the pelvis as well. Her PCP did an abdominal US which showed the GB to be contracted and the distal common bile duct to be prominent. The pelvic organs were not visualized. She did bring in a cath specimen since she does have to cath for neurogenic bladder. Past Medical History, Past Surgical History, Family history, Social History, and Medications were all reviewed with the patient today and updated as necessary. Current Outpatient Medications   Medication Sig    TRELEGY ELLIPTA 100-62.5-25 MCG/ACT AEPB inhaler Inhale 1 puff into the lungs daily    zolpidem (AMBIEN) 10 MG tablet Take 1 tablet by mouth. at bedtime    nitrofurantoin, macrocrystal-monohydrate, (MACROBID) 100 MG capsule BID for 10 days    VYVANSE 50 MG capsule TAKE 1 CAPSULE BY MOUTH EVERY DAY    omeprazole (PRILOSEC) 40 MG delayed release capsule     ondansetron (ZOFRAN-ODT) 4 MG disintegrating tablet DISSOLVE 1 TABLET ON THE TONGUE THREE TIMES DAILY    famotidine (PEPCID) 20 MG tablet as needed    albuterol sulfate HFA (PROVENTIL;VENTOLIN;PROAIR) 108 (90 Base) MCG/ACT inhaler Inhale 1-2 puffs into the lungs every 6 hours as needed    HYDROcodone-acetaminophen (NORCO) 7.5-325 MG per tablet Take 1 tablet by mouth in the morning and 1 tablet at noon and 1 tablet in the evening. linaclotide (LINZESS) 290 MCG CAPS capsule Take by mouth every morning (before breakfast)    polyethylene glycol (GLYCOLAX) 17 GM/SCOOP powder Take 17 g by mouth 2 times daily    metoclopramide (REGLAN) 5 MG tablet TAKE 1 TABLET BY MOUTH TWICE DAILY (Patient not taking: Reported on 3/16/2023)    ciprofloxacin (CIPRO) 500 MG tablet Take 500 mg by mouth 2 times daily (Patient not taking: Reported on 3/16/2023)     No current facility-administered medications for this visit.      Allergies   Allergen Reactions

## 2023-11-10 ENCOUNTER — HOSPITAL ENCOUNTER (OUTPATIENT)
Dept: CT IMAGING | Age: 43
Discharge: HOME | End: 2023-11-10
Payer: COMMERCIAL

## 2023-11-10 DIAGNOSIS — R10.2 PELVIC PAIN: ICD-10-CM

## 2023-11-10 DIAGNOSIS — K82.0 CONTRACTION, GALLBLADDER: ICD-10-CM

## 2023-11-10 DIAGNOSIS — R10.10 PAIN OF UPPER ABDOMEN: ICD-10-CM

## 2023-11-10 DIAGNOSIS — K83.9 BILE DUCT ABNORMALITY: ICD-10-CM

## 2023-11-10 PROCEDURE — 6360000004 HC RX CONTRAST MEDICATION: Performed by: OBSTETRICS & GYNECOLOGY

## 2023-11-10 PROCEDURE — 2580000003 HC RX 258: Performed by: OBSTETRICS & GYNECOLOGY

## 2023-11-10 PROCEDURE — 74177 CT ABD & PELVIS W/CONTRAST: CPT

## 2023-11-10 RX ORDER — SODIUM CHLORIDE 0.9 % (FLUSH) 0.9 %
10 SYRINGE (ML) INJECTION
Status: COMPLETED | OUTPATIENT
Start: 2023-11-10 | End: 2023-11-10

## 2023-11-10 RX ORDER — 0.9 % SODIUM CHLORIDE 0.9 %
100 INTRAVENOUS SOLUTION INTRAVENOUS
Status: COMPLETED | OUTPATIENT
Start: 2023-11-10 | End: 2023-11-10

## 2023-11-10 RX ADMIN — IOPAMIDOL 100 ML: 755 INJECTION, SOLUTION INTRAVENOUS at 10:44

## 2023-11-10 RX ADMIN — DIATRIZOATE MEGLUMINE AND DIATRIZOATE SODIUM 15 ML: 660; 100 LIQUID ORAL; RECTAL at 10:43

## 2023-11-10 RX ADMIN — SODIUM CHLORIDE 100 ML: 9 INJECTION, SOLUTION INTRAVENOUS at 10:44

## 2023-11-10 RX ADMIN — SODIUM CHLORIDE, PRESERVATIVE FREE 10 ML: 5 INJECTION INTRAVENOUS at 10:44

## 2023-11-11 ENCOUNTER — HOSPITAL ENCOUNTER (OUTPATIENT)
Dept: MAMMOGRAPHY | Age: 43
End: 2023-11-11
Attending: OBSTETRICS & GYNECOLOGY
Payer: COMMERCIAL

## 2023-11-11 VITALS — BODY MASS INDEX: 25.83 KG/M2 | HEIGHT: 65 IN | WEIGHT: 155 LBS

## 2023-11-11 DIAGNOSIS — Z12.31 SCREENING MAMMOGRAM, ENCOUNTER FOR: ICD-10-CM

## 2023-11-11 PROCEDURE — 77063 BREAST TOMOSYNTHESIS BI: CPT

## 2023-11-12 LAB
BACTERIA SPEC CULT: ABNORMAL
SERVICE CMNT-IMP: ABNORMAL

## 2023-11-13 ENCOUNTER — PROCEDURE VISIT (OUTPATIENT)
Dept: OBGYN CLINIC | Age: 43
End: 2023-11-13

## 2023-11-13 VITALS — DIASTOLIC BLOOD PRESSURE: 80 MMHG | SYSTOLIC BLOOD PRESSURE: 118 MMHG

## 2023-11-13 DIAGNOSIS — R10.10 PAIN OF UPPER ABDOMEN: ICD-10-CM

## 2023-11-13 DIAGNOSIS — Z87.828 HISTORY OF SPINAL CORD INJURY: ICD-10-CM

## 2023-11-13 DIAGNOSIS — R10.2 PELVIC PAIN: Primary | ICD-10-CM

## 2023-11-13 LAB
COLLECTION METHOD: NORMAL
CYTOLOGIST CVX/VAG CYTO: NORMAL
CYTOLOGY CVX/VAG DOC THIN PREP: NORMAL
DATE OF LMP: NORMAL
HPV REFLEX: NORMAL
Lab: NORMAL
PAP SOURCE: NORMAL
PATH REPORT.FINAL DX SPEC: NORMAL
PREV TREATMENT: NORMAL
STAT OF ADQ CVX/VAG CYTO-IMP: NORMAL

## 2023-11-13 NOTE — PROGRESS NOTES
on 3/16/2023)     No current facility-administered medications for this visit. Allergies   Allergen Reactions    Penicillins Other (See Comments)     Patient had reaction during childhood that she believes caused her to lose temporary feeling in her legs.     Povidone-Iodine Hives and Rash     Past Medical History:   Diagnosis Date    Abdominal pain     Abnormal Pap smear of cervix     ADD (attention deficit disorder)     Anxiety     Back pain     Chronic cystitis 4/3/2014    Closed fracture of left fibula and tibia     COPD (chronic obstructive pulmonary disease) (formerly [facility])     Depression     Edema     Flank pain     Fusion of spine, cervical region     GERD (gastroesophageal reflux disease)     Gross hematuria     H/O colonoscopy 05/2023        Hand deformities, acquired     Hand pain     Herpes simplex     Incontinence of urine in female     Left ankle pain     Neurogenic bladder 4/3/2014    Neurogenic bladder     Neurogenic bladder, NOS     Neuropathy     Obesity     Other chronic cystitis     Paraplegia (HCC)     Quadriplegia (formerly [facility])     Urinary tract infection, site not specified      Past Surgical History:   Procedure Laterality Date    COLPOSCOPY      FLEXIBLE SIGMOIDOSCOPY N/A 3/28/2019    SIGMOIDOSCOPY FLEXIBLE performed by Phoenix Kamara MD at [address] [facility]    spinal fusion    ORTHOPEDIC SURGERY      OTHER SURGICAL HISTORY      implanted stimulator with 8 electrodes    OTHER SURGICAL HISTORY      Tendon ransfers bilateral forearms     Family History   Problem Relation Age of Onset    Coronary Art Dis Maternal Grandmother     Heart Disease Maternal Grandmother     Coronary Art Dis Maternal Uncle     Breast Cancer Maternal Aunt     Coronary Art Dis Maternal Grandfather     High Cholesterol Other         gen fam HX    Heart Disease Mother     Hypertension Mother     Coronary Art Dis Mother     Cancer Other         gen fam HX    Diabetes Other         gen fam HX    Heart

## 2023-11-20 ENCOUNTER — HOSPITAL ENCOUNTER (OUTPATIENT)
Dept: NUCLEAR MEDICINE | Age: 43
Discharge: HOME | End: 2023-11-23
Payer: COMMERCIAL

## 2023-11-20 VITALS — BODY MASS INDEX: 25.79 KG/M2 | WEIGHT: 155 LBS

## 2023-11-20 DIAGNOSIS — R10.11 RIGHT UPPER QUADRANT PAIN: ICD-10-CM

## 2023-11-20 PROCEDURE — 3430000000 HC RX DIAGNOSTIC RADIOPHARMACEUTICAL: Performed by: FAMILY MEDICINE

## 2023-11-20 PROCEDURE — A9537 TC99M MEBROFENIN: HCPCS | Performed by: FAMILY MEDICINE

## 2023-11-20 PROCEDURE — 78227 HEPATOBIL SYST IMAGE W/DRUG: CPT

## 2023-11-20 PROCEDURE — 6360000004 HC RX CONTRAST MEDICATION: Performed by: FAMILY MEDICINE

## 2023-11-20 RX ORDER — SINCALIDE 5 UG/5ML
0.02 INJECTION, POWDER, LYOPHILIZED, FOR SOLUTION INTRAVENOUS ONCE
Status: COMPLETED | OUTPATIENT
Start: 2023-11-20 | End: 2023-11-20

## 2023-11-20 RX ORDER — KIT FOR THE PREPARATION OF TECHNETIUM TC 99M MEBROFENIN 45 MG/10ML
6.3 INJECTION, POWDER, LYOPHILIZED, FOR SOLUTION INTRAVENOUS
Status: COMPLETED | OUTPATIENT
Start: 2023-11-20 | End: 2023-11-20

## 2023-11-20 RX ADMIN — SINCALIDE 1.41 MCG: 5 INJECTION, POWDER, LYOPHILIZED, FOR SOLUTION INTRAVENOUS at 12:09

## 2023-11-20 RX ADMIN — MEBROFENIN 6.3 MILLICURIE: 45 INJECTION, POWDER, LYOPHILIZED, FOR SOLUTION INTRAVENOUS at 11:31

## 2023-12-13 ENCOUNTER — OFFICE VISIT (OUTPATIENT)
Dept: UROLOGY | Age: 43
End: 2023-12-13
Payer: COMMERCIAL

## 2023-12-13 DIAGNOSIS — N30.20 CHRONIC CYSTITIS: ICD-10-CM

## 2023-12-13 DIAGNOSIS — N31.9 NEUROGENIC BLADDER: ICD-10-CM

## 2023-12-13 DIAGNOSIS — R10.2 PELVIC PAIN: Primary | ICD-10-CM

## 2023-12-13 LAB
BILIRUBIN, URINE, POC: NEGATIVE
BLOOD URINE, POC: NEGATIVE
GLUCOSE URINE, POC: NEGATIVE
KETONES, URINE, POC: NEGATIVE
LEUKOCYTE ESTERASE, URINE, POC: NEGATIVE
NITRITE, URINE, POC: NEGATIVE
PH, URINE, POC: 6 (ref 4.6–8)
PROTEIN,URINE, POC: NEGATIVE
SPECIFIC GRAVITY, URINE, POC: 1.01 (ref 1–1.03)
URINALYSIS CLARITY, POC: NORMAL
URINALYSIS COLOR, POC: NORMAL
UROBILINOGEN, POC: NORMAL

## 2023-12-13 PROCEDURE — 81003 URINALYSIS AUTO W/O SCOPE: CPT | Performed by: NURSE PRACTITIONER

## 2023-12-13 PROCEDURE — 99204 OFFICE O/P NEW MOD 45 MIN: CPT | Performed by: NURSE PRACTITIONER

## 2023-12-13 RX ORDER — AMITRIPTYLINE HYDROCHLORIDE 10 MG/1
10 TABLET, FILM COATED ORAL NIGHTLY
Qty: 90 TABLET | Refills: 3 | Status: SHIPPED | OUTPATIENT
Start: 2023-12-13

## 2023-12-13 RX ORDER — METHENAMINE HIPPURATE 1000 MG/1
1 TABLET ORAL 2 TIMES DAILY WITH MEALS
Qty: 60 TABLET | Refills: 5 | Status: SHIPPED | OUTPATIENT
Start: 2023-12-13

## 2023-12-13 ASSESSMENT — ENCOUNTER SYMPTOMS
CONSTIPATION: 1
SHORTNESS OF BREATH: 0
NAUSEA: 1
VOMITING: 1
BLOOD IN STOOL: 0
SKIN LESIONS: 0
BACK PAIN: 1
EYE DISCHARGE: 0
DIARRHEA: 1
INDIGESTION: 0
WHEEZING: 0
ABDOMINAL PAIN: 1
HEARTBURN: 0
EYE PAIN: 0
COUGH: 0

## 2023-12-13 NOTE — PROGRESS NOTES
Heart Disease Mother     Hypertension Mother     Coronary Art Dis Mother     Cancer Other         gen fam HX    Diabetes Other         gen fam HX    Heart Disease Maternal Grandfather        Review of Systems  Constitutional: Positive for fever, chills, appetite change and malaise/fatigue. Negative for headaches and weight loss. Skin: Positive for rash. Negative for skin lesions and itching. Eyes:  Negative for visual disturbance, eye pain and eye discharge. ENT:  Negative for difficulty articulating words, pain swallowing, high frequency hearing loss and dry mouth. Respiratory:  Negative for cough, blood in sputum, shortness of breath and wheezing. Cardiovascular:  Negative for chest pain, hypertension, irregular heartbeat, leg pain, leg swelling, regular rate and rhythm and varicose veins. GI: Positive for nausea, vomiting, abdominal pain, constipation and diarrhea. Negative for blood in stool, indigestion and heartburn. Genitourinary: Positive for hematuria, recurrent UTIs, history of urolithiasis, urgency, leakage w/ urge and menstrual problem. Negative for urinary burning, flank pain, nocturia, slower stream, straining, frequent urination, incomplete emptying, sexually transmitted disease, endometriosis, vaginal pain and hysterectomy. Musculoskeletal: Positive for back pain, arthralgias and muscle weakness. Negative for bone pain, tenderness and neck pain. Neurological: Positive for numbness. Negative for dizziness, focal weakness, seizures and tremors. Psychological: Positive for depression. Negative for psychiatric problem. Endocrine: Positive for fatigue and heat intolerance. Negative for cold intolerance, thirst and excessive urination. Hem/Lymphatic: Positive for frequent infections. Negative for easy bleeding and easy bruising.       Urinalysis  UA - Dipstick  Results for orders placed or performed in visit on 12/13/23   AMB POC URINALYSIS DIP STICK AUTO W/O MICRO   Result Value Ref Range

## 2024-01-16 ENCOUNTER — OFFICE VISIT (OUTPATIENT)
Dept: SURGERY | Age: 44
End: 2024-01-16
Payer: COMMERCIAL

## 2024-01-16 ENCOUNTER — PREP FOR PROCEDURE (OUTPATIENT)
Dept: SURGERY | Age: 44
End: 2024-01-16

## 2024-01-16 VITALS
BODY MASS INDEX: 25.79 KG/M2 | SYSTOLIC BLOOD PRESSURE: 128 MMHG | HEIGHT: 65 IN | DIASTOLIC BLOOD PRESSURE: 69 MMHG | HEART RATE: 81 BPM

## 2024-01-16 DIAGNOSIS — R10.11 ABDOMINAL PAIN, RIGHT UPPER QUADRANT: ICD-10-CM

## 2024-01-16 DIAGNOSIS — R10.11 RUQ PAIN: Primary | ICD-10-CM

## 2024-01-16 DIAGNOSIS — R52 PAIN AGGRAVATED BY EATING OR DRINKING: ICD-10-CM

## 2024-01-16 PROCEDURE — 99204 OFFICE O/P NEW MOD 45 MIN: CPT | Performed by: SURGERY

## 2024-01-16 ASSESSMENT — ENCOUNTER SYMPTOMS
ABDOMINAL PAIN: 1
CONSTIPATION: 1
EYES NEGATIVE: 1
DIARRHEA: 1
NAUSEA: 1
ALLERGIC/IMMUNOLOGIC NEGATIVE: 1
RESPIRATORY NEGATIVE: 1

## 2024-01-16 NOTE — PROGRESS NOTES
Deepthi Sterling MD   Bariatric & Advanced Laparoscopic Surgery & Endoscopy  [address], [address]  [place] [address]  Phone [phone]   Fax [phone]      Date of visit: 2024      Primary/Requesting provider: Cem Moore Jr., MD         Name: Christel Cruz      MRN: [id number]       : 1980       Age: 43 y.o.    Sex: female        PCP: Cem Moore Jr., MD     CC:    Chief Complaint   Patient presents with    New Patient     Pain during HIDA scan       HPI:     Christel Crzu is a 43 y.o. female who presents for evaluation of RUQ pain. She reports pain is intermittent and is worse with fatty foods. Pain is 9/10. Pain radiates to her ANAM area and right shoulder. She had a HIDA scan recently and it showed high EF and pain was NOT reproduced during the study.         PMH:    Past Medical History:   Diagnosis Date    Abdominal pain     Abnormal Pap smear of cervix     ADD (attention deficit disorder)     Anxiety     Back pain     Chronic cystitis 4/3/2014    Closed fracture of left fibula and tibia     COPD (chronic obstructive pulmonary disease) (HCC)     Depression     Edema     Flank pain     Fusion of spine, cervical region     GERD (gastroesophageal reflux disease)     Gross hematuria     H/O colonoscopy 2023        Hand deformities, acquired     Hand pain     Herpes simplex     Incontinence of urine in female     Left ankle pain     Neurogenic bladder 4/3/2014    Neurogenic bladder     Neurogenic bladder, NOS     Neuropathy     Obesity     Other chronic cystitis     Paraplegia (HCC)     Quadriplegia (HCC)     Urinary tract infection, site not specified        PSH:    Past Surgical History:   Procedure Laterality Date    COLPOSCOPY      FLEXIBLE SIGMOIDOSCOPY N/A 3/28/2019    SIGMOIDOSCOPY FLEXIBLE performed by Alvaro Mullins MD at [facility] ENDOSCOPY    ORTHOPEDIC SURGERY      spinal fusion    ORTHOPEDIC SURGERY      OTHER

## 2024-01-17 ENCOUNTER — PROCEDURE VISIT (OUTPATIENT)
Dept: UROLOGY | Age: 44
End: 2024-01-17
Payer: COMMERCIAL

## 2024-01-17 DIAGNOSIS — N31.9 NEUROGENIC BLADDER: ICD-10-CM

## 2024-01-17 DIAGNOSIS — R10.2 PELVIC PAIN: Primary | ICD-10-CM

## 2024-01-17 LAB
BILIRUBIN, URINE, POC: NEGATIVE
BLOOD URINE, POC: NEGATIVE
GLUCOSE URINE, POC: NEGATIVE
KETONES, URINE, POC: NEGATIVE
LEUKOCYTE ESTERASE, URINE, POC: NEGATIVE
NITRITE, URINE, POC: NEGATIVE
PH, URINE, POC: 7 (ref 4.6–8)
PROTEIN,URINE, POC: NEGATIVE
SPECIFIC GRAVITY, URINE, POC: 1.01 (ref 1–1.03)
URINALYSIS CLARITY, POC: NORMAL
URINALYSIS COLOR, POC: NORMAL
UROBILINOGEN, POC: NORMAL

## 2024-01-17 PROCEDURE — 99213 OFFICE O/P EST LOW 20 MIN: CPT | Performed by: UROLOGY

## 2024-01-17 PROCEDURE — 81003 URINALYSIS AUTO W/O SCOPE: CPT | Performed by: UROLOGY

## 2024-01-17 PROCEDURE — 52000 CYSTOURETHROSCOPY: CPT | Performed by: UROLOGY

## 2024-01-17 RX ORDER — TROSPIUM CHLORIDE ER 60 MG/1
60 CAPSULE ORAL DAILY
Qty: 30 CAPSULE | Refills: 11 | Status: SHIPPED | OUTPATIENT
Start: 2024-01-17
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
## 2024-01-30 ENCOUNTER — TELEPHONE (OUTPATIENT)
Dept: SURGERY | Age: 44
End: 2024-01-30

## 2024-01-30 NOTE — TELEPHONE ENCOUNTER
Patient left a message that she is sick and had to cancel surgery today. I have left a message for her to call me and reschedule.

## 2024-02-01 ENCOUNTER — PREP FOR PROCEDURE (OUTPATIENT)
Dept: SURGERY | Age: 44
End: 2024-02-01

## 2024-02-22 RX ORDER — DICYCLOMINE HYDROCHLORIDE 10 MG/1
10 CAPSULE ORAL 4 TIMES DAILY PRN
COMMUNITY
Start: 2022-07-20

## 2024-02-22 RX ORDER — PROMETHAZINE HYDROCHLORIDE 12.5 MG/1
12.5 TABLET ORAL 4 TIMES DAILY PRN
Status: ON HOLD | COMMUNITY
End: 2024-02-26

## 2024-02-22 RX ORDER — FAMOTIDINE 40 MG/1
40 TABLET, FILM COATED ORAL 2 TIMES DAILY PRN
COMMUNITY
Start: 2023-11-16

## 2024-02-22 NOTE — PERIOP NOTE
Patient verified name and .  Order for consent found in EHR and matches case posting; patient verifies procedure.   Type 1B surgery, phone assessment complete. Orders received.  Labs per surgeon: none.   Labs per anesthesia protocol: none needed.     Surgery was originally scheduled for 24 but patient had a fever and vomiting. Denied any respiratory symptoms including cough. Symptoms now resolved.     Patient answered medical/surgical history questions at their best of ability. All prior to admission medications documented in EPIC.  Patient instructed to take the following medications the day of surgery according to anesthesia guidelines with a small sip of water: Albuterol inhaler if needed, Dicyclomine if needed, Norco if needed, Reglan if needed, Omeprazole, Zofran if needed, Phenergan if needed, Trelegy if needed. Hold all vitamins 7 days prior to surgery and NSAIDS 5 days prior to surgery. Prescription meds to hold: none.     Patient instructed on the following:  >Bring Inhalers.   > Arrive at A Entrance, time of arrival to be called the day before by 1700  > NPO after midnight, unless otherwise indicated, including gum, mints, and ice chips  > Responsible adult must drive patient to the hospital, stay during surgery, and patient will need supervision 24 hours after anesthesia  > Use non moisturizing soap in shower the night before surgery and on the morning of surgery  > All piercings must be removed prior to arrival.    > Leave all valuables (money and jewelry) at home but bring insurance card and ID on DOS.   > You may be required to pay a deductible or co-pay on the day of your procedure. You can pre-pay by calling [phone] if your surgery is at the [facility] or [phone] if your surgery is at the [facility].  > Do not wear make-up, nail polish, lotions, cologne, perfumes, powders, or oil on skin. Artificial nails are not permitted.

## 2024-02-25 ENCOUNTER — ANESTHESIA EVENT (OUTPATIENT)
Dept: SURGERY | Age: 44
End: 2024-02-25
Payer: COMMERCIAL

## 2024-02-25 RX ORDER — FENTANYL CITRATE 50 UG/ML
100 INJECTION, SOLUTION INTRAMUSCULAR; INTRAVENOUS
Status: CANCELLED | OUTPATIENT
Start: 2024-02-25 | End: 2024-02-26

## 2024-02-25 RX ORDER — MIDAZOLAM HYDROCHLORIDE 2 MG/2ML
2 INJECTION, SOLUTION INTRAMUSCULAR; INTRAVENOUS
Status: CANCELLED | OUTPATIENT
Start: 2024-02-25 | End: 2024-02-26

## 2024-02-26 ENCOUNTER — HOSPITAL ENCOUNTER (OUTPATIENT)
Age: 44
Setting detail: OUTPATIENT SURGERY
Discharge: HOME | End: 2024-02-26
Attending: SURGERY | Admitting: SURGERY
Payer: COMMERCIAL

## 2024-02-26 ENCOUNTER — ANESTHESIA (OUTPATIENT)
Dept: SURGERY | Age: 44
End: 2024-02-26
Payer: COMMERCIAL

## 2024-02-26 VITALS
DIASTOLIC BLOOD PRESSURE: 56 MMHG | OXYGEN SATURATION: 96 % | TEMPERATURE: 98 F | RESPIRATION RATE: 18 BRPM | BODY MASS INDEX: 25.79 KG/M2 | SYSTOLIC BLOOD PRESSURE: 105 MMHG | HEIGHT: 65 IN | HEART RATE: 69 BPM

## 2024-02-26 LAB — HCG UR QL: NEGATIVE

## 2024-02-26 PROCEDURE — 3700000000 HC ANESTHESIA ATTENDED CARE: Performed by: SURGERY

## 2024-02-26 PROCEDURE — 6370000000 HC RX 637 (ALT 250 FOR IP): Performed by: ANESTHESIOLOGY

## 2024-02-26 PROCEDURE — 2500000003 HC RX 250 WO HCPCS: Performed by: STUDENT IN AN ORGANIZED HEALTH CARE EDUCATION/TRAINING PROGRAM

## 2024-02-26 PROCEDURE — 2580000003 HC RX 258: Performed by: ANESTHESIOLOGY

## 2024-02-26 PROCEDURE — 6360000002 HC RX W HCPCS: Performed by: NURSE ANESTHETIST, CERTIFIED REGISTERED

## 2024-02-26 PROCEDURE — 3700000001 HC ADD 15 MINUTES (ANESTHESIA): Performed by: SURGERY

## 2024-02-26 PROCEDURE — 6360000002 HC RX W HCPCS: Performed by: STUDENT IN AN ORGANIZED HEALTH CARE EDUCATION/TRAINING PROGRAM

## 2024-02-26 PROCEDURE — 6360000002 HC RX W HCPCS: Performed by: ANESTHESIOLOGY

## 2024-02-26 PROCEDURE — 88304 TISSUE EXAM BY PATHOLOGIST: CPT

## 2024-02-26 PROCEDURE — 81025 URINE PREGNANCY TEST: CPT

## 2024-02-26 PROCEDURE — 3600000019 HC SURGERY ROBOT ADDTL 15MIN: Performed by: SURGERY

## 2024-02-26 PROCEDURE — 2500000003 HC RX 250 WO HCPCS: Performed by: SURGERY

## 2024-02-26 PROCEDURE — 2709999900 HC NON-CHARGEABLE SUPPLY: Performed by: SURGERY

## 2024-02-26 PROCEDURE — 7100000000 HC PACU RECOVERY - FIRST 15 MIN: Performed by: SURGERY

## 2024-02-26 PROCEDURE — 6360000002 HC RX W HCPCS: Performed by: SURGERY

## 2024-02-26 PROCEDURE — 7100000011 HC PHASE II RECOVERY - ADDTL 15 MIN: Performed by: SURGERY

## 2024-02-26 PROCEDURE — 47562 LAPAROSCOPIC CHOLECYSTECTOMY: CPT | Performed by: SURGERY

## 2024-02-26 PROCEDURE — 7100000001 HC PACU RECOVERY - ADDTL 15 MIN: Performed by: SURGERY

## 2024-02-26 PROCEDURE — 2720000010 HC SURG SUPPLY STERILE: Performed by: SURGERY

## 2024-02-26 PROCEDURE — C1889 IMPLANT/INSERT DEVICE, NOC: HCPCS | Performed by: SURGERY

## 2024-02-26 PROCEDURE — 3600000009 HC SURGERY ROBOT BASE: Performed by: SURGERY

## 2024-02-26 PROCEDURE — S2900 ROBOTIC SURGICAL SYSTEM: HCPCS | Performed by: SURGERY

## 2024-02-26 PROCEDURE — 7100000010 HC PHASE II RECOVERY - FIRST 15 MIN: Performed by: SURGERY

## 2024-02-26 DEVICE — CLIP INT M L POLYMER LOK LIG HEM O LOK: Type: IMPLANTABLE DEVICE | Site: BILE DUCT | Status: FUNCTIONAL

## 2024-02-26 RX ORDER — MIDAZOLAM HYDROCHLORIDE 1 MG/ML
INJECTION INTRAMUSCULAR; INTRAVENOUS PRN
Status: DISCONTINUED | OUTPATIENT
Start: 2024-02-26 | End: 2024-02-26 | Stop reason: SDUPTHER

## 2024-02-26 RX ORDER — SODIUM CHLORIDE 9 MG/ML
INJECTION, SOLUTION INTRAVENOUS PRN
Status: DISCONTINUED | OUTPATIENT
Start: 2024-02-26 | End: 2024-02-26 | Stop reason: HOSPADM

## 2024-02-26 RX ORDER — SODIUM CHLORIDE, SODIUM LACTATE, POTASSIUM CHLORIDE, CALCIUM CHLORIDE 600; 310; 30; 20 MG/100ML; MG/100ML; MG/100ML; MG/100ML
INJECTION, SOLUTION INTRAVENOUS CONTINUOUS
Status: DISCONTINUED | OUTPATIENT
Start: 2024-02-26 | End: 2024-02-26 | Stop reason: HOSPADM

## 2024-02-26 RX ORDER — SODIUM CHLORIDE 0.9 % (FLUSH) 0.9 %
5-40 SYRINGE (ML) INJECTION PRN
Status: DISCONTINUED | OUTPATIENT
Start: 2024-02-26 | End: 2024-02-26 | Stop reason: HOSPADM

## 2024-02-26 RX ORDER — HYDROMORPHONE HYDROCHLORIDE 1 MG/ML
0.5 INJECTION, SOLUTION INTRAMUSCULAR; INTRAVENOUS; SUBCUTANEOUS EVERY 5 MIN PRN
Status: DISCONTINUED | OUTPATIENT
Start: 2024-02-26 | End: 2024-02-26 | Stop reason: HOSPADM

## 2024-02-26 RX ORDER — BUPIVACAINE HYDROCHLORIDE 5 MG/ML
INJECTION, SOLUTION EPIDURAL; INTRACAUDAL PRN
Status: DISCONTINUED | OUTPATIENT
Start: 2024-02-26 | End: 2024-02-26 | Stop reason: ALTCHOICE

## 2024-02-26 RX ORDER — ACETAMINOPHEN 500 MG
1000 TABLET ORAL ONCE
Status: COMPLETED | OUTPATIENT
Start: 2024-02-26 | End: 2024-02-26

## 2024-02-26 RX ORDER — ROCURONIUM BROMIDE 10 MG/ML
INJECTION, SOLUTION INTRAVENOUS PRN
Status: DISCONTINUED | OUTPATIENT
Start: 2024-02-26 | End: 2024-02-26 | Stop reason: SDUPTHER

## 2024-02-26 RX ORDER — PROCHLORPERAZINE EDISYLATE 5 MG/ML
5 INJECTION INTRAMUSCULAR; INTRAVENOUS
Status: DISCONTINUED | OUTPATIENT
Start: 2024-02-26 | End: 2024-02-26 | Stop reason: HOSPADM

## 2024-02-26 RX ORDER — ONDANSETRON 2 MG/ML
INJECTION INTRAMUSCULAR; INTRAVENOUS PRN
Status: DISCONTINUED | OUTPATIENT
Start: 2024-02-26 | End: 2024-02-26 | Stop reason: SDUPTHER

## 2024-02-26 RX ORDER — PROPOFOL 10 MG/ML
INJECTION, EMULSION INTRAVENOUS PRN
Status: DISCONTINUED | OUTPATIENT
Start: 2024-02-26 | End: 2024-02-26 | Stop reason: SDUPTHER

## 2024-02-26 RX ORDER — ONDANSETRON 2 MG/ML
4 INJECTION INTRAMUSCULAR; INTRAVENOUS
Status: DISCONTINUED | OUTPATIENT
Start: 2024-02-26 | End: 2024-02-26 | Stop reason: HOSPADM

## 2024-02-26 RX ORDER — FENTANYL CITRATE 50 UG/ML
INJECTION, SOLUTION INTRAMUSCULAR; INTRAVENOUS PRN
Status: DISCONTINUED | OUTPATIENT
Start: 2024-02-26 | End: 2024-02-26 | Stop reason: SDUPTHER

## 2024-02-26 RX ORDER — KETOROLAC TROMETHAMINE 30 MG/ML
INJECTION, SOLUTION INTRAMUSCULAR; INTRAVENOUS PRN
Status: DISCONTINUED | OUTPATIENT
Start: 2024-02-26 | End: 2024-02-26 | Stop reason: SDUPTHER

## 2024-02-26 RX ORDER — OXYCODONE HYDROCHLORIDE 5 MG/1
5 TABLET ORAL
Status: COMPLETED | OUTPATIENT
Start: 2024-02-26 | End: 2024-02-26

## 2024-02-26 RX ORDER — OXYCODONE HYDROCHLORIDE AND ACETAMINOPHEN 5; 325 MG/1; MG/1
1 TABLET ORAL EVERY 6 HOURS PRN
Qty: 28 TABLET | Refills: 0 | OUTPATIENT
Start: 2024-02-26 | End: 2024-03-04

## 2024-02-26 RX ORDER — LIDOCAINE HYDROCHLORIDE 20 MG/ML
INJECTION, SOLUTION EPIDURAL; INFILTRATION; INTRACAUDAL; PERINEURAL PRN
Status: DISCONTINUED | OUTPATIENT
Start: 2024-02-26 | End: 2024-02-26 | Stop reason: SDUPTHER

## 2024-02-26 RX ORDER — HALOPERIDOL 5 MG/ML
1 INJECTION INTRAMUSCULAR
Status: DISCONTINUED | OUTPATIENT
Start: 2024-02-26 | End: 2024-02-26 | Stop reason: HOSPADM

## 2024-02-26 RX ORDER — INDOCYANINE GREEN AND WATER 25 MG
5 KIT INJECTION ONCE
Status: COMPLETED | OUTPATIENT
Start: 2024-02-26 | End: 2024-02-26

## 2024-02-26 RX ORDER — SODIUM CHLORIDE 0.9 % (FLUSH) 0.9 %
5-40 SYRINGE (ML) INJECTION EVERY 12 HOURS SCHEDULED
Status: DISCONTINUED | OUTPATIENT
Start: 2024-02-26 | End: 2024-02-26 | Stop reason: HOSPADM

## 2024-02-26 RX ORDER — LIDOCAINE HYDROCHLORIDE 10 MG/ML
1 INJECTION, SOLUTION INFILTRATION; PERINEURAL
Status: DISCONTINUED | OUTPATIENT
Start: 2024-02-26 | End: 2024-02-26 | Stop reason: HOSPADM

## 2024-02-26 RX ORDER — APREPITANT 40 MG/1
40 CAPSULE ORAL ONCE
Status: COMPLETED | OUTPATIENT
Start: 2024-02-26 | End: 2024-02-26

## 2024-02-26 RX ORDER — SCOLOPAMINE TRANSDERMAL SYSTEM 1 MG/1
1 PATCH, EXTENDED RELEASE TRANSDERMAL ONCE
Status: DISCONTINUED | OUTPATIENT
Start: 2024-02-26 | End: 2024-02-26 | Stop reason: HOSPADM

## 2024-02-26 RX ADMIN — INDOCYANINE GREEN AND WATER 5 MG: KIT at 10:48

## 2024-02-26 RX ADMIN — KETOROLAC TROMETHAMINE 30 MG: 30 INJECTION, SOLUTION INTRAMUSCULAR; INTRAVENOUS at 13:15

## 2024-02-26 RX ADMIN — ACETAMINOPHEN 1000 MG: 500 TABLET, FILM COATED ORAL at 10:34

## 2024-02-26 RX ADMIN — SODIUM CHLORIDE, POTASSIUM CHLORIDE, SODIUM LACTATE AND CALCIUM CHLORIDE: 600; 310; 30; 20 INJECTION, SOLUTION INTRAVENOUS at 11:44

## 2024-02-26 RX ADMIN — FENTANYL CITRATE 100 MCG: 50 INJECTION, SOLUTION INTRAMUSCULAR; INTRAVENOUS at 12:21

## 2024-02-26 RX ADMIN — ONDANSETRON 4 MG: 2 INJECTION INTRAMUSCULAR; INTRAVENOUS at 12:32

## 2024-02-26 RX ADMIN — APREPITANT 40 MG: 40 CAPSULE ORAL at 11:44

## 2024-02-26 RX ADMIN — PROPOFOL 200 MG: 10 INJECTION, EMULSION INTRAVENOUS at 12:21

## 2024-02-26 RX ADMIN — Medication 2000 MG: at 12:15

## 2024-02-26 RX ADMIN — MIDAZOLAM 2 MG: 1 INJECTION INTRAMUSCULAR; INTRAVENOUS at 12:14

## 2024-02-26 RX ADMIN — ROCURONIUM BROMIDE 30 MG: 50 INJECTION, SOLUTION INTRAVENOUS at 12:21

## 2024-02-26 RX ADMIN — OXYCODONE 5 MG: 5 TABLET ORAL at 13:50

## 2024-02-26 RX ADMIN — LIDOCAINE HYDROCHLORIDE 80 MG: 20 INJECTION, SOLUTION EPIDURAL; INFILTRATION; INTRACAUDAL; PERINEURAL at 12:21

## 2024-02-26 ASSESSMENT — PAIN DESCRIPTION - ONSET: ONSET: GRADUAL

## 2024-02-26 ASSESSMENT — ENCOUNTER SYMPTOMS
DIARRHEA: 1
NAUSEA: 1
EYES NEGATIVE: 1
CONSTIPATION: 1
ALLERGIC/IMMUNOLOGIC NEGATIVE: 1
ABDOMINAL PAIN: 1
RESPIRATORY NEGATIVE: 1

## 2024-02-26 ASSESSMENT — PAIN DESCRIPTION - PAIN TYPE: TYPE: SURGICAL PAIN

## 2024-02-26 ASSESSMENT — PAIN SCALES - GENERAL
PAINLEVEL_OUTOF10: 6
PAINLEVEL_OUTOF10: 6

## 2024-02-26 ASSESSMENT — PAIN DESCRIPTION - DESCRIPTORS: DESCRIPTORS: ACHING

## 2024-02-26 ASSESSMENT — PAIN DESCRIPTION - LOCATION: LOCATION: ABDOMEN

## 2024-02-26 ASSESSMENT — PAIN - FUNCTIONAL ASSESSMENT: PAIN_FUNCTIONAL_ASSESSMENT: 0-10

## 2024-02-26 NOTE — PERIOP NOTE
MD Mcgrath at bedside with patient. Pt VS stable. Pain and nausea controlled at this time. Verbal signout per MD when PACU care is completed. Plan of care continues.

## 2024-02-26 NOTE — ANESTHESIA POSTPROCEDURE EVALUATION
Department of Anesthesiology  Postprocedure Note    Patient: Christel Cruz  MRN: [id number]  YOB: 1980  Date of evaluation: 2/26/2024    Procedure Summary       Date: 02/26/24 Room / Location: [facility] MAIN OR 07 / [facility] MAIN OR    Anesthesia Start: 1200 Anesthesia Stop: 1322    Procedure: CHOLECYSTECTOMY LAPAROSCOPIC ROBOTIC (Abdomen) Diagnosis:       Abdominal pain, right upper quadrant      (Abdominal pain, right upper quadrant [R10.11])    Surgeons: Deepthi Haley MD Responsible Provider: LAURE Mcgrath MD    Anesthesia Type: general ASA Status: 3            Anesthesia Type: No value filed.    Guillaume Phase I: Guillaume Score: 9    Guillaume Phase II:      Anesthesia Post Evaluation    Patient location during evaluation: PACU  Patient participation: complete - patient participated  Level of consciousness: awake and alert  Airway patency: patent  Nausea & Vomiting: no nausea and no vomiting  Cardiovascular status: hemodynamically stable  Respiratory status: acceptable  Hydration status: euvolemic  Comments: Blood pressure 115/83, pulse 69, temperature 98 °F (36.7 °C), temperature source Infrared, resp. rate 15, height 1.651 m (5' 5\"), last menstrual period 02/08/2024, SpO2 100 %.    No apparent anesthetic complications.  Pt stable for discharge from PACU  Multimodal analgesia pain management approach  Pain management: adequate    No notable events documented.

## 2024-02-26 NOTE — OP NOTE
visualization.     The [employer] grasper was used to retract the fundus of the gallbladder cephalad. There were multiple adhesions from the omentum to the gallbladder and these were carefully divided using hook electrocautery. The infundibulum of the gallbladder was exposed. The infundibulum was retracted inferiorly and laterally. The triangle of Calot was exposed using the hook cautery to clear the triangle of Calot of peritoneum and fat both anteriorly and posteriorly. This dissection was extended laterally and cephalad on the anterior and posterior walls of the gallbladder. The cystic duct was identified and circumferentially dissected. The cystic duct was identified at its connection with the gallbladder infundibulum. We used Firefly to confirm the location of the cystic duct related to the common bile duct and we were far away from the insertion of the cystic duct into the common bile duct. The anterior cystic artery was identified and dissected circumferentially. A critical view was obtained of the triangle of Calot both anteriorly and posteriorly. Pictures of the critical view were taken for the chart. Robotic clips were applied to the cystic artery and cystic duct. Two clips were applied proximally and one clip was applied distally. The cystic artery and cystic duct were divided sharply.     The gallbladder was removed from the gallbladder fossa using a hook cautery. Hemostasis was verified along the liver bed and the clips were visualized with no evidence of bile leaking or bleeding. The gallbladder was placed in an endocatch bag and removed through the periumbilical incision. The liver bed and clips were again visualized and in place, there was good hemostasis. The periumbilical fascia was closed with 0-Vicryl sutures in interrupted fashion. The skin of all cannula insertion sites was closed with 4-0 Monocryl subcuticular sutures. Mastisol and Steri strips were applied to the skin incisions.  All counts were

## 2024-02-26 NOTE — ANESTHESIA PRE PROCEDURE
Once Davie Cao MD       • lactated ringers IV soln infusion   IntraVENous Continuous Davie Cao MD       • sodium chloride flush 0.9 % injection 5-40 mL  5-40 mL IntraVENous 2 times per day Davie Cao MD       • sodium chloride flush 0.9 % injection 5-40 mL  5-40 mL IntraVENous PRN Davie Cao MD       • 0.9 % sodium chloride infusion   IntraVENous PRN aDvie Cao MD           Allergies:    Allergies   Allergen Reactions   • Penicillins Other (See Comments)     Patient had reaction during childhood that she believes caused her to lose temporary feeling in her legs.   • Povidone-Iodine Hives and Rash       Problem List:    Patient Active Problem List   Diagnosis Code   • Neurogenic bladder N31.9   • Paraplegia ([facility]) G82.20   • Depression F32.A   • Abdominal pain R10.9   • Carpal tunnel syndrome on both sides G56.03   • Pain in both wrists M25.531, M25.532   • Anxiety F41.9   • ADD (attention deficit disorder) F98.8   • GERD (gastroesophageal reflux disease) K21.9   • Chronic cystitis N30.20   • Quadriplegia ([facility]) G82.50   • Back pain M54.9   • Precordial pain R07.2   • Family history of ischemic heart disease (IHD) Z82.49   • EKG abnormality R94.31   • Abdominal pain, right upper quadrant R10.11       Past Medical History:        Diagnosis Date   • Abdominal pain    • Abnormal Pap smear of cervix    • ADD (attention deficit disorder)    • Anxiety    • Back pain    • Chronic cystitis 4/3/2014   • Closed fracture of left fibula and tibia    • COPD (chronic obstructive pulmonary disease) ([facility])     PRN inhalers--last used 2/16/24;  followed by PCP   • Depression    • Edema    • Flank pain    • Fusion of spine, cervical region    • GERD (gastroesophageal reflux disease)     managed with medication   • Gross hematuria    • H/O colonoscopy 05/2023       • Hand deformities, acquired    • Hand pain    • Herpes simplex    • Incontinence of urine in female    • Left ankle pain    •

## 2024-02-26 NOTE — DISCHARGE INSTRUCTIONS
No bathtub or pool for 2 weeks. Ok to shower.  No weight lifting greater than 20 lbs for 4 weeks.  Avoid fatty and spicy foods for 2-3 days and then introduce then slowly.    Leave the skin glue or Steri strips in place. They will fall off on their own in 7-10 days.    If not already scheduled, please call the office and schedule a 2 week postoperative follow up.    Take tylenol or ibuprofen for as needed for mild pain every 4-6 hours.   Percocet prescribed for mod to severe pain. This is a narcotic and can cause drowsiness, nausea and vomiting and constipation.  Take Colace 100mg BID (over the counter) if constipated.   After general anesthesia or intravenous sedation, for 24 hours or while taking prescription Narcotics:  Limit your activities  A responsible adult needs to be with you for the next 24 hours  Do not drive and operate hazardous machinery  Do not make important personal or business decisions  Do not drink alcoholic beverages  If you have not urinated within 8 hours after discharge, and you are experiencing discomfort from urinary retention, please go to the nearest ED.  If you have sleep apnea and have a CPAP machine, please use it for all naps and sleeping.  Please use caution when taking narcotics and any of your home medications that may cause drowsiness.  *  Please give a list of your current medications to your Primary Care Provider.  *  Please update this list whenever your medications are discontinued, doses are      changed, or new medications (including over-the-counter products) are added.  *  Please carry medication information at all times in case of emergency situations.    These are general instructions for a healthy lifestyle:  No smoking/ No tobacco products/ Avoid exposure to second hand smoke  Surgeon General's Warning:  Quitting smoking now greatly reduces serious risk to your health.  Obesity, smoking, and sedentary lifestyle greatly increases your risk for illness  A healthy diet,

## 2024-02-27 ENCOUNTER — TELEPHONE (OUTPATIENT)
Dept: SURGERY | Age: 44
End: 2024-02-27

## 2024-02-27 NOTE — TELEPHONE ENCOUNTER
Patient called stating that she was in a \"severe amount of pain\". Patient is afraid of taking Norco (unclear where she obtained Norco) because she does not want to get constipated. Patient is requesting a Rx for a muscle relaxer or Percocet.

## 2024-03-13 ASSESSMENT — ENCOUNTER SYMPTOMS
CONSTIPATION: 0
DIARRHEA: 0
ABDOMINAL PAIN: 0
NAUSEA: 0
SHORTNESS OF BREATH: 0
VOMITING: 0

## 2024-03-13 NOTE — PROGRESS NOTES
Deepthi Sterling MD   Bariatric & Advanced Laparoscopic Surgery & Endoscopy  [address], [address]  [place] [address]  Phone [phone]   Fax [phone]      poDate: 3/14/2024    Name: Christel Cruz      MRN: [id number]       : 1980       Age: 43 y.o.    Sex: female        Cem Moore Jr., MD     CC:    Chief Complaint   Patient presents with    Post-Op Check      post op-24-lap arlette         HPI:  The patient presents for a post-op visit s/p robot arlette on 24. she is doing well and has no major complaints. Patient is tolerating diet and having bowel movements.     ROS:   Review of Systems   Constitutional:  Negative for chills and fever.   Respiratory:  Negative for shortness of breath.    Gastrointestinal:  Negative for abdominal pain, constipation, diarrhea, nausea and vomiting.         Physical Exam:     LMP 2024 (Approximate)     General: Alert, oriented, cooperative and in no acute distress.   Lap Incision clean, dry and intact.    Assessment/Plan:  Christel Cruz is a 43 y.o. female who is s/p robot arlette. Copy of path discussed with the patient.     1. Follow-up prn    2. Return to full activity in 2 weeks    3. Regular diet    Signed: Deepthi Sterling MD  [facility] - Bariatric & Minimally Invasive Surgery  3/14/2024 10:11 AM

## 2024-03-14 ENCOUNTER — OFFICE VISIT (OUTPATIENT)
Dept: SURGERY | Age: 44
End: 2024-03-14

## 2024-03-14 DIAGNOSIS — Z90.49 S/P LAPAROSCOPIC CHOLECYSTECTOMY: Primary | ICD-10-CM

## 2024-03-14 PROCEDURE — 99024 POSTOP FOLLOW-UP VISIT: CPT | Performed by: SURGERY

## 2024-06-19 ENCOUNTER — OFFICE VISIT (OUTPATIENT)
Dept: UROLOGY | Age: 44
End: 2024-06-19
Payer: COMMERCIAL

## 2024-06-19 DIAGNOSIS — R10.2 PELVIC PAIN: Primary | ICD-10-CM

## 2024-06-19 DIAGNOSIS — R10.2 PELVIC PAIN: ICD-10-CM

## 2024-06-19 DIAGNOSIS — N30.20 CHRONIC CYSTITIS: ICD-10-CM

## 2024-06-19 DIAGNOSIS — N31.9 NEUROGENIC BLADDER: ICD-10-CM

## 2024-06-19 PROCEDURE — 81003 URINALYSIS AUTO W/O SCOPE: CPT | Performed by: NURSE PRACTITIONER

## 2024-06-19 PROCEDURE — 99214 OFFICE O/P EST MOD 30 MIN: CPT | Performed by: NURSE PRACTITIONER

## 2024-06-19 RX ORDER — AMITRIPTYLINE HYDROCHLORIDE 10 MG/1
10 TABLET, FILM COATED ORAL NIGHTLY
Qty: 90 TABLET | Refills: 3 | Status: SHIPPED | OUTPATIENT
Start: 2024-06-19

## 2024-06-19 ASSESSMENT — ENCOUNTER SYMPTOMS
BACK PAIN: 0
NAUSEA: 0

## 2024-06-19 NOTE — PROGRESS NOTES
[facility] UROLOGY  [address] [place] [address]  [phone]          Christel Cruz  : 1980    Chief Complaint   Patient presents with    Follow-up          HPI     Christel Cruz is a 43 y.o. female    Referred back to us due to ongoing issues with pelvic pain.  Patient has actually been followed by Dr. Resendiz and Dr. Collazo in the past.  She has also been seen by Alka Lenz NP.  She has history of neurogenic bladder, chronic cystitis and gross hematuria.  She had a complete hematuria workup done in 2017 with the only finding being overall normal.     Last visit here was in 2017.     Recently patient has noticed generalized abdominal pain.  The pain is intermittent in nature.  It can be severe at times.  It feels like a \"spasm.\"  She has noticed that the pain is worsened with intermittent cathing.     Had an ultrasound that suggest possible gallbladder issues.  She underwent a CT scan and HIDA scan however that did not show any issues with her gallbladder.  CT and general showed kidneys to be normal other than a small left renal stone.  See results of that as below.        IMPRESSION:  -No acute pathology within the abdomen or pelvis.   -A few small nonobstructive left-sided renal calculi.     Pelvic exam with gynecology was also within normal limits.     She had a recent urine culture done 2023.  Results of that came back positive showing Citrobacter.  It was sensitive to Macrobid.  Patient is on a second round of Macrobid currently.  The antibiotic does help somewhat with her symptoms.     She receives her catheter supplies from [employer].  She is using [employer].     Had cystoscopy at her last visit and there was bladder wall thickening.  Patient was instructed to cath at least 4 times a day.  She was also started on trospium but she has not seen significant improvement with that medication.    In questioning her about the amitriptyline she to does not

## 2024-06-22 LAB
BACTERIA SPEC CULT: NORMAL
BACTERIA SPEC CULT: NORMAL
SERVICE CMNT-IMP: NORMAL

## 2024-08-05 ENCOUNTER — OFFICE VISIT (OUTPATIENT)
Dept: UROLOGY | Age: 44
End: 2024-08-05
Payer: COMMERCIAL

## 2024-08-05 DIAGNOSIS — N39.0 URINARY TRACT INFECTION WITHOUT HEMATURIA, SITE UNSPECIFIED: ICD-10-CM

## 2024-08-05 DIAGNOSIS — N39.0 URINARY TRACT INFECTION WITHOUT HEMATURIA, SITE UNSPECIFIED: Primary | ICD-10-CM

## 2024-08-05 LAB
BILIRUBIN, URINE, POC: NEGATIVE
BLOOD URINE, POC: NORMAL
GLUCOSE URINE, POC: NEGATIVE
KETONES, URINE, POC: NEGATIVE
LEUKOCYTE ESTERASE, URINE, POC: NORMAL
NITRITE, URINE, POC: POSITIVE
PH, URINE, POC: 6.5 (ref 4.6–8)
PROTEIN,URINE, POC: 30
SPECIFIC GRAVITY, URINE, POC: 1.02 (ref 1–1.03)
URINALYSIS CLARITY, POC: NORMAL
URINALYSIS COLOR, POC: NORMAL
UROBILINOGEN, POC: NORMAL

## 2024-08-05 PROCEDURE — 81003 URINALYSIS AUTO W/O SCOPE: CPT | Performed by: NURSE PRACTITIONER

## 2024-08-05 PROCEDURE — 99211 OFF/OP EST MAY X REQ PHY/QHP: CPT | Performed by: NURSE PRACTITIONER

## 2024-08-05 NOTE — PROGRESS NOTES
UA - Dipstick  Results for orders placed or performed in visit on 08/05/24   AMB POC URINALYSIS DIP STICK AUTO W/O MICRO   Result Value Ref Range    Color (UA POC)      Clarity (UA POC)      Glucose, Urine, POC Negative     Bilirubin, Urine, POC Negative     KETONES, Urine, POC Negative     Specific Gravity, Urine, POC 1.020 1.001 - 1.035    Blood (UA POC) Large     pH, Urine, POC 6.5 4.6 - 8.0    Protein, Urine, POC 30     Urobilinogen, POC 0.2 mg/dL     Nitrite, Urine, POC Positive     Leukocyte Esterase, Urine, POC Moderate        UA - Micro  WBC - 0  RBC - 0  Bacteria - 0  Epith - 0      Requested Prescriptions      No prescriptions requested or ordered in this encounter     Plan    Diagnosis Orders   1. Urinary tract infection without hematuria, site unspecified  AMB POC URINALYSIS DIP STICK AUTO W/O MICRO    Culture, Urine        Culture the urine  We will treat if indicated.   Have pt increase fluid intake    Orders Placed This Encounter    Culture, Urine     Standing Status:   Future     Standing Expiration Date:   8/5/2025     Order Specific Question:   Specify (ex-cath, midstream, cysto, etc)?     Answer:   voided    AMB POC URINALYSIS DIP STICK AUTO W/O MICRO

## 2024-08-07 LAB
BACTERIA SPEC CULT: NORMAL
BACTERIA SPEC CULT: NORMAL
SERVICE CMNT-IMP: NORMAL

## 2025-04-24 NOTE — H&P
SURGICAL HISTORY      implanted stimulator with 8 electrodes    OTHER SURGICAL HISTORY      Tendon ransfers bilateral forearms       MEDS:    Current Outpatient Medications   Medication Sig Dispense Refill    methenamine (HIPREX) 1 g tablet Take 1 tablet by mouth 2 times daily (with meals) 60 tablet 5    amitriptyline (ELAVIL) 10 MG tablet Take 1 tablet by mouth nightly 90 tablet 3    TRELEGY ELLIPTA 100-62.5-25 MCG/ACT AEPB inhaler Inhale 1 puff into the lungs daily      zolpidem (AMBIEN) 10 MG tablet Take 1 tablet by mouth. at bedtime      nitrofurantoin, macrocrystal-monohydrate, (MACROBID) 100 MG capsule BID for 10 days 20 capsule 5    VYVANSE 50 MG capsule TAKE 1 CAPSULE BY MOUTH EVERY DAY      metoclopramide (REGLAN) 5 MG tablet       omeprazole (PRILOSEC) 40 MG delayed release capsule       ondansetron (ZOFRAN-ODT) 4 MG disintegrating tablet DISSOLVE 1 TABLET ON THE TONGUE THREE TIMES DAILY      famotidine (PEPCID) 20 MG tablet as needed      albuterol sulfate HFA (PROVENTIL;VENTOLIN;PROAIR) 108 (90 Base) MCG/ACT inhaler Inhale 1-2 puffs into the lungs every 6 hours as needed      ciprofloxacin (CIPRO) 500 MG tablet Take 1 tablet by mouth 2 times daily      HYDROcodone-acetaminophen (NORCO) 7.5-325 MG per tablet Take 1 tablet by mouth in the morning and 1 tablet at noon and 1 tablet in the evening.      linaclotide (LINZESS) 290 MCG CAPS capsule Take by mouth every morning (before breakfast)      polyethylene glycol (GLYCOLAX) 17 GM/SCOOP powder Take 17 g by mouth 2 times daily       No current facility-administered medications for this visit.        ALLERGIES:      Allergies   Allergen Reactions    Penicillins Other (See Comments)     Patient had reaction during childhood that she believes caused her to lose temporary feeling in her legs.    Povidone-Iodine Hives and Rash       SH:    Social History     Tobacco Use    Smoking status: Never    Smokeless tobacco: Never   Substance Use Topics    Alcohol use: 
Fever

## 2025-08-14 ENCOUNTER — OFFICE VISIT (OUTPATIENT)
Dept: UROLOGY | Age: 45
End: 2025-08-14
Payer: COMMERCIAL

## 2025-08-14 DIAGNOSIS — N31.9 NEUROGENIC BLADDER: ICD-10-CM

## 2025-08-14 DIAGNOSIS — G82.20 PARAPLEGIA (HCC): ICD-10-CM

## 2025-08-14 DIAGNOSIS — R10.2 PELVIC PAIN: ICD-10-CM

## 2025-08-14 DIAGNOSIS — N39.0 URINARY TRACT INFECTION WITHOUT HEMATURIA, SITE UNSPECIFIED: Primary | ICD-10-CM

## 2025-08-14 DIAGNOSIS — N39.0 URINARY TRACT INFECTION WITHOUT HEMATURIA, SITE UNSPECIFIED: ICD-10-CM

## 2025-08-14 LAB
BILIRUBIN, URINE, POC: NEGATIVE
BLOOD URINE, POC: ABNORMAL
GLUCOSE URINE, POC: NEGATIVE MG/DL
KETONES, URINE, POC: NEGATIVE MG/DL
LEUKOCYTE ESTERASE, URINE, POC: ABNORMAL
NITRITE, URINE, POC: POSITIVE
PH, URINE, POC: 5.5 (ref 4.6–8)
PROTEIN,URINE, POC: 30 MG/DL
SPECIFIC GRAVITY, URINE, POC: 1.01 (ref 1–1.03)
URINALYSIS CLARITY, POC: ABNORMAL
URINALYSIS COLOR, POC: ABNORMAL
UROBILINOGEN, POC: ABNORMAL MG/DL

## 2025-08-14 PROCEDURE — 81003 URINALYSIS AUTO W/O SCOPE: CPT | Performed by: NURSE PRACTITIONER

## 2025-08-14 PROCEDURE — 99214 OFFICE O/P EST MOD 30 MIN: CPT | Performed by: NURSE PRACTITIONER

## 2025-08-14 RX ORDER — METHENAMINE HIPPURATE 1000 MG/1
1 TABLET ORAL 2 TIMES DAILY WITH MEALS
Qty: 60 TABLET | Refills: 5 | Status: SHIPPED | OUTPATIENT
Start: 2025-08-14

## 2025-08-14 ASSESSMENT — ENCOUNTER SYMPTOMS
BACK PAIN: 0
NAUSEA: 0

## 2025-08-16 LAB
BACTERIA SPEC CULT: ABNORMAL
SERVICE CMNT-IMP: ABNORMAL

## (undated) DEVICE — SINGLE-USE BIOPSY FORCEPS: Brand: RADIAL JAW 4

## (undated) DEVICE — FORCEPS BX L240CM JAW DIA2.8MM L CAP W/ NDL MIC MESH TOOTH

## (undated) DEVICE — TUBING, SUCTION, 3/16" X 6', STRAIGHT: Brand: MEDLINE

## (undated) DEVICE — CONNECTOR TBNG OD5-7MM O2 END DISP

## (undated) DEVICE — ARM DRAPE

## (undated) DEVICE — GENERAL ROBOTIC: Brand: MEDLINE INDUSTRIES, INC.

## (undated) DEVICE — BW-412T DISP COMBO CLEANING BRUSH: Brand: SINGLE USE COMBINATION CLEANING BRUSH

## (undated) DEVICE — CONTAINER PREFIL FRMLN 40ML --

## (undated) DEVICE — STRIP,CLOSURE,WOUND,MEDI-STRIP,1/2X4: Brand: MEDLINE

## (undated) DEVICE — TIP COVER ACCESSORY

## (undated) DEVICE — NDL PRT INJ NSAF BLNT 18GX1.5 --

## (undated) DEVICE — GLOVE SURG SZ 65 THK91MIL LTX FREE SYN POLYISOPRENE

## (undated) DEVICE — VALVE SUCTION AIR H2O SET ORCA POD + DISP

## (undated) DEVICE — Z DISC USE 2764362 SEAL ENDOSCP INSTR DIA5-8MM UNIV FOR CANN DA VINCI XI

## (undated) DEVICE — SNARE POLYP SM W13MMXL240CM SHTH DIA2.4MM OVL FLX DISP

## (undated) DEVICE — SUCTION IRRIGATOR: Brand: ENDOWRIST

## (undated) DEVICE — KENDALL RADIOLUCENT FOAM MONITORING ELECTRODE RECTANGULAR SHAPE: Brand: KENDALL

## (undated) DEVICE — BLOCK BITE AD 60FR W/ VELC STRP ADDRESSES MOST PT AND

## (undated) DEVICE — SUTURE SZ 0 27IN 5/8 CIR UR-6  TAPER PT VIOLET ABSRB VICRYL J603H

## (undated) DEVICE — MASTISOL ADHESIVE LIQ 2/3ML

## (undated) DEVICE — CANNULA NSL AD CO2 SAMP FOR DASH 3000 4000 MON LO FLO

## (undated) DEVICE — APPLICATOR MEDICATED 26 CC SOLUTION HI LT ORNG CHLORAPREP

## (undated) DEVICE — SYR 5ML 1/5 GRAD LL NSAF LF --

## (undated) DEVICE — INSUFFLATION NEEDLE TO ESTABLISH PNEUMOPERITONEUM.: Brand: INSUFFLATION NEEDLE

## (undated) DEVICE — PAD PT POS 36 IN SURGYPAD DISP

## (undated) DEVICE — BLADELESS OBTURATOR: Brand: WECK VISTA

## (undated) DEVICE — COLUMN DRAPE

## (undated) DEVICE — GLOVE SURG SZ 7 L12IN FNGR THK79MIL GRN LTX FREE

## (undated) DEVICE — BLOCK BITE 60FR W/ DENT RIM SCRIP ONLY

## (undated) DEVICE — SUTURE MCRYL SZ 4-0 L18IN ABSRB UD L19MM PS-2 3/8 CIR PRIM Y496G

## (undated) DEVICE — TISSUE RETRIEVAL SYSTEM: Brand: INZII RETRIEVAL SYSTEM

## (undated) DEVICE — AIR/WATER CLEANING ADAPTER FOR OLYMPUS® GI ENDOSCOPE: Brand: BULLDOG®

## (undated) DEVICE — GAUZE,SPONGE,4"X4",12PLY,WOVEN,NS,LF: Brand: MEDLINE

## (undated) DEVICE — CANNULA NSL ORAL AD FOR CAPNOFLEX CO2 O2 AIRLFE

## (undated) DEVICE — MARKER SURG SKIN UTIL BLK REG TIP NONSMEARING W/ 6IN RUL

## (undated) DEVICE — SYR 3ML LL TIP 1/10ML GRAD --